# Patient Record
Sex: FEMALE | Race: WHITE | NOT HISPANIC OR LATINO
[De-identification: names, ages, dates, MRNs, and addresses within clinical notes are randomized per-mention and may not be internally consistent; named-entity substitution may affect disease eponyms.]

---

## 2017-03-23 ENCOUNTER — RESULT REVIEW (OUTPATIENT)
Age: 62
End: 2017-03-23

## 2017-05-23 ENCOUNTER — APPOINTMENT (OUTPATIENT)
Dept: INTERNAL MEDICINE | Facility: CLINIC | Age: 62
End: 2017-05-23

## 2017-05-23 VITALS
SYSTOLIC BLOOD PRESSURE: 115 MMHG | HEIGHT: 62 IN | RESPIRATION RATE: 14 BRPM | OXYGEN SATURATION: 97 % | WEIGHT: 136 LBS | BODY MASS INDEX: 25.03 KG/M2 | TEMPERATURE: 97.9 F | DIASTOLIC BLOOD PRESSURE: 70 MMHG | HEART RATE: 63 BPM

## 2017-05-23 DIAGNOSIS — Z87.898 PERSONAL HISTORY OF OTHER SPECIFIED CONDITIONS: ICD-10-CM

## 2017-05-23 DIAGNOSIS — N89.8 OTHER SPECIFIED NONINFLAMMATORY DISORDERS OF VAGINA: ICD-10-CM

## 2017-05-23 DIAGNOSIS — Z82.0 FAMILY HISTORY OF EPILEPSY AND OTHER DISEASES OF THE NERVOUS SYSTEM: ICD-10-CM

## 2017-05-23 DIAGNOSIS — R19.8 OTHER SPECIFIED SYMPTOMS AND SIGNS INVOLVING THE DIGESTIVE SYSTEM AND ABDOMEN: ICD-10-CM

## 2017-05-24 LAB
25(OH)D3 SERPL-MCNC: 44.6 NG/ML
ALBUMIN SERPL ELPH-MCNC: 4.8 G/DL
ALP BLD-CCNC: 57 U/L
ALT SERPL-CCNC: 33 U/L
ANION GAP SERPL CALC-SCNC: 23 MMOL/L
APPEARANCE: CLEAR
AST SERPL-CCNC: 23 U/L
BASOPHILS # BLD AUTO: 0.03 K/UL
BASOPHILS NFR BLD AUTO: 0.6 %
BILIRUB SERPL-MCNC: 0.8 MG/DL
BILIRUBIN URINE: NEGATIVE
BLOOD URINE: NEGATIVE
BUN SERPL-MCNC: 14 MG/DL
CALCIUM SERPL-MCNC: 10.2 MG/DL
CHLORIDE SERPL-SCNC: 103 MMOL/L
CHOLEST SERPL-MCNC: 287 MG/DL
CHOLEST/HDLC SERPL: 3.3 RATIO
CO2 SERPL-SCNC: 19 MMOL/L
COLOR: YELLOW
CREAT SERPL-MCNC: 0.94 MG/DL
EOSINOPHIL # BLD AUTO: 0.16 K/UL
EOSINOPHIL NFR BLD AUTO: 3.3 %
GLUCOSE QUALITATIVE U: NORMAL MG/DL
GLUCOSE SERPL-MCNC: 91 MG/DL
HBA1C MFR BLD HPLC: 5.2 %
HCT VFR BLD CALC: 43.4 %
HDLC SERPL-MCNC: 88 MG/DL
HGB BLD-MCNC: 14.3 G/DL
IMM GRANULOCYTES NFR BLD AUTO: 0 %
KETONES URINE: NEGATIVE
LDLC SERPL CALC-MCNC: 159 MG/DL
LEUKOCYTE ESTERASE URINE: NEGATIVE
LYMPHOCYTES # BLD AUTO: 1.8 K/UL
LYMPHOCYTES NFR BLD AUTO: 37.3 %
MAN DIFF?: NORMAL
MCHC RBC-ENTMCNC: 30.6 PG
MCHC RBC-ENTMCNC: 32.9 GM/DL
MCV RBC AUTO: 92.9 FL
MONOCYTES # BLD AUTO: 0.33 K/UL
MONOCYTES NFR BLD AUTO: 6.8 %
NEUTROPHILS # BLD AUTO: 2.5 K/UL
NEUTROPHILS NFR BLD AUTO: 52 %
NITRITE URINE: NEGATIVE
PH URINE: 8
PLATELET # BLD AUTO: 217 K/UL
POTASSIUM SERPL-SCNC: 4.3 MMOL/L
PROT SERPL-MCNC: 8 G/DL
PROTEIN URINE: NEGATIVE MG/DL
RBC # BLD: 4.67 M/UL
RBC # FLD: 12.9 %
SODIUM SERPL-SCNC: 145 MMOL/L
SPECIFIC GRAVITY URINE: 1.02
TRIGL SERPL-MCNC: 200 MG/DL
TSH SERPL-ACNC: 1.77 UIU/ML
UROBILINOGEN URINE: NORMAL MG/DL
WBC # FLD AUTO: 4.82 K/UL

## 2018-06-18 ENCOUNTER — APPOINTMENT (OUTPATIENT)
Dept: INTERNAL MEDICINE | Facility: CLINIC | Age: 63
End: 2018-06-18
Payer: COMMERCIAL

## 2018-06-18 ENCOUNTER — LABORATORY RESULT (OUTPATIENT)
Age: 63
End: 2018-06-18

## 2018-06-18 VITALS
DIASTOLIC BLOOD PRESSURE: 70 MMHG | WEIGHT: 138 LBS | SYSTOLIC BLOOD PRESSURE: 120 MMHG | TEMPERATURE: 98.5 F | OXYGEN SATURATION: 98 % | HEIGHT: 62 IN | BODY MASS INDEX: 25.4 KG/M2 | RESPIRATION RATE: 14 BRPM | HEART RATE: 73 BPM

## 2018-06-18 DIAGNOSIS — Z78.9 OTHER SPECIFIED HEALTH STATUS: ICD-10-CM

## 2018-06-18 PROCEDURE — 99396 PREV VISIT EST AGE 40-64: CPT | Mod: 25

## 2018-06-18 PROCEDURE — 36415 COLL VENOUS BLD VENIPUNCTURE: CPT

## 2018-06-18 RX ORDER — ESTRADIOL 0.1 MG/G
0.1 CREAM VAGINAL
Refills: 0 | Status: DISCONTINUED | COMMUNITY
Start: 2017-05-23 | End: 2018-06-18

## 2018-06-18 NOTE — ASSESSMENT
[FreeTextEntry1] : \par hx skin irritation- at right ear lobe, no evidence of infection currently\par -advised to monitor, avoid touching, use OTC topical abx prn\par -advised to f/u if sx's worsen, pain, d/c et.\par -advised derm eval if lesion does not completely resolve\par -advised to check pillow re: ? cause for irritation and avoid use\par \par hx diarrhea/constipations- chronic, stable, hx 11/16 c-scope per pt; asx\par -followed by GI, seen 10/16 in 11/16 had EGD and c-scope, told showed ? mild UC but not tx needed- f/u pending\par \par hx osteoporosis, vit d insuff--\par -hx DEXA in 1/17 by GYN per pt, repeat due 2019\par -on actonel qwk (since 2/14)\par -on ca+D\par -exercise encouraged\par -check vit d level\par \par migraines--asx, infrequent\par -hx negative w/u and MRI imaging by neuro --given imitrex 50mg with reported good control.  \par -on imitrex prn\par \par HLD--5/17 Tchol 287   HD 88\par -hx declined Rx\par -on diet control, low fat diet counseled\par -declines nutrition eval\par -check lipids today\par \par hx stress- stable per pt, denies depression or anxiety\par -declines BH referral\par -advised to f/u if sx's worsen\par \par \par HCM\par --fasting screening labs; declines HIV/STD screening\par --5/14 screening hep C AB negative\par --declines flu shots\par --hx Tdap 2011\par --advised shingrix vaccine (out of stock today)- to f/u with pharmacy, to f/u in office if unable to obtain there\par --hx neg PAP 3/17 by GYN per pt\par --hx negative screening mammo 3/18 by GYN per pt\par --hx screening colonoscopy 11/16: told hemorrhoids, and repeat in 5 yrs per pt\par --followed by derm, hx screening 8/17- yearly advised.  Regular use of sun block for skin cancer prevention counseled. \par --yearly eye exam advised, has own doctor\par \par Pt's cell: [phone]\par Pt declines f/u appt, to call back.

## 2018-06-18 NOTE — HEALTH RISK ASSESSMENT
[MammogramDate] : 3/18 [PapSmearDate] : 11/17 [BoneDensityDate] : 1/17 [BoneDensityComments] : osteoporosis [ColonoscopyDate] : 11/16 [HepatitisCDate] : 5/14 [HepatitisCComments] : negative

## 2018-06-18 NOTE — REVIEW OF SYSTEMS
[see HPI] : see HPI [Negative] : Musculoskeletal [Fever] : no fever [Chills] : no chills [Feeling Poorly] : not feeling poorly [Feeling Tired] : not feeling tired [Chest Pain] : no chest pain [Palpitations] : no palpitations [Lower Ext Edema] : no lower extremity edema [Shortness Of Breath] : no shortness of breath [Cough] : no cough [Orthopnea] : no orthopnea [PND] : no PND [Abdominal Pain] : no abdominal pain [Vomiting] : no vomiting [Dizziness] : no dizziness [Limb Weakness] : no limb weakness [Difficulty Walking] : no difficulty walking [Suicidal] : not suicidal [Anxiety] : no anxiety [Depression] : no depression

## 2018-06-18 NOTE — PHYSICAL EXAM
[General Appearance - Alert] : alert [General Appearance - In No Acute Distress] : in no acute distress [General Appearance - Well-Appearing] : healthy appearing [Sclera] : the sclera and conjunctiva were normal [PERRL With Normal Accommodation] : pupils were equal in size, round, and reactive to light [Extraocular Movements] : extraocular movements were intact [Outer Ear] : the ears and nose were normal in appearance [Both Tympanic Membranes Were Examined] : both tympanic membranes were normal [Nasal Cavity] : the nasal mucosa and septum were normal [Oropharynx] : the oropharynx was normal [Neck Appearance] : the appearance of the neck was normal [Thyroid Diffuse Enlargement] : the thyroid was not enlarged [Thyroid Nodule] : there were no palpable thyroid nodules [Respiration, Rhythm And Depth] : normal respiratory rhythm and effort [Auscultation Breath Sounds / Voice Sounds] : lungs were clear to auscultation bilaterally [Heart Rate And Rhythm] : heart rate was normal and rhythm regular [Heart Sounds] : normal S1 and S2 [Murmurs] : no murmurs [Full Pulse] : the pedal pulses are present [Edema] : there was no peripheral edema [Bowel Sounds] : normal bowel sounds [Abdomen Soft] : soft [Abdomen Tenderness] : non-tender [Cervical Lymph Nodes Enlarged Posterior Bilaterally] : posterior cervical [Cervical Lymph Nodes Enlarged Anterior Bilaterally] : anterior cervical [Supraclavicular Lymph Nodes Enlarged Bilaterally] : supraclavicular [No CVA Tenderness] : no ~M costovertebral angle tenderness [No Spinal Tenderness] : no spinal tenderness [Abnormal Walk] : normal gait [Musculoskeletal - Swelling] : no joint swelling seen [] : no rash [No Focal Deficits] : no focal deficits [Oriented To Time, Place, And Person] : oriented to person, place, and time [Affect] : the affect was normal [Mood] : the mood was normal [FreeTextEntry1] : scattered freckles; right ear lob: tiny area of scab w/o focal inflammation or tenderness

## 2018-06-18 NOTE — HISTORY OF PRESENT ILLNESS
[Health Maintenance] : health maintenance [___ Year(s) Ago] : [unfilled] year(s) ago [___ Daughter(s)] : [unfilled] daughter(s) [Never Smoked Cigarettes] : has never smoked cigarettes [___ Drinks/Day] : drinking [unfilled] drinks per day [Never] : has never used illicit drugs [Good] : good [Reg. Dental Visits] : She has regular dental visits [Healthy Diet] : She consumes a diverse and healthy diet [Regular Exercise] : She exercises regularly [Alcohol Use] : She consumes alcohol [Occasional Use] : occasional alcohol use [Postmenopausal] : the patient is postmenopausal [Uncertain Timing] : uncertain timing of her last human papilloma virus screening [Never Performed] : no previous colposcopy [Performed Irregular] : irregular breast self-exams performed [Performed: ___] : a colonoscopy performed [unfilled] [FreeTextEntry1] : \par Feeling well today.\par Fasting for labs.\par Needs med refills.\par \par \par Reports had dry skin at right ear- picked it off with hand (did not come off)- next day was area hot, throbbing; no d/c or fever/chills.  Getting less since.  Used OTC spray (for cuts/scrapes, antibacterial) to area.\par -thinks acquired due to irritation of area from pillow case\par \par hx diarrhea/constipation with urge to defecate- chronic, stable\par -states resolved since had c-scope 11/16\par -followed by GI, seen 10/16 in 11/16 had EGD and c-scope, told showed ? mild UC but not tx needed- f/u pending\par -wt stable, appetite nl; no BRBPR or melena\par -denies fatigue, heat/cold intolerance or hair/nail issues\par \par hx stress- stable, mainly 2/2 mom with dementia- in NH, is wheelchair bound, sometimes forgets names, gets help caring for her sisters\par -denies depression or anxiety\par -reports good social support from family\par -hx therapist in past when  passed away- states can always return there, does not feel is needed\par \par hx migraine HA-- Rare onset.  Usually has change in vision and b/l periocular HA--seen by neurology in past yrs ago, had brain MRI told nl and started on imitrex 50mg x1 then with good control.  Denies any focal weakness.\par \par HLD-- never started zocor due to concern for SEs\par -avoids fatty foods, more veggies\par -exercise: walks 2x/wk x 30 mins\par \par hx OP, vitamin d insuff- denies hx fractures\par -following with GYN, Dr. Javed- had DEXA 1/17 told slightly better and to cont. actonel.  n\par -on actonel, Ca+D\par \par \par Not sexually active > 5 yrs, denies hx of STD dx.  \par -denies any  complaints.\par  [Binge Drinking] : denies binge drinking [Patient Concern] : no personal concern about alcohol use [Family Concern] : no family concern about alcohol use [Vision Problems] : She denies vision problems [Hearing Loss] : She denies hearing loss [Weight Concerns] : She does not have any weight concerns [Tobacco Use] : She does not use tobacco [Drug Abuse] : She denies drug use [de-identified] :  [de-identified] : retired [de-identified] : declines flu shots, hx Tdap 2011, no hx shingles vaccine

## 2018-06-18 NOTE — PAST MEDICAL HISTORY
[Menopause Age____] : age at menopause was [unfilled] [Total Preg ___] : G: [unfilled] [Living ___] : Living: [unfilled]  [AB Spont ___] : miscarriage(s): [unfilled]

## 2018-06-18 NOTE — DISCUSSION/SUMMARY
[FreeTextEntry1] : \par hx diarrhea/constipations- resolved since 11/16 c-scope per pt; asx\par -followed by GI, seen 10/16 in 11/16 had EGD and c-scope, told showed ? mild UC but not tx needed- f/u pending\par \par hx osteoporosis, vit d insuff--\par -hx DEXA in 1/17 by GYN per pt\par -on actonel qwk (since 2/14)\par -on ca+D\par -exercise encouraged\par \par hx arthritis--rt thumb, stable\par -followed by ortho\par -using Tylenol prn, rarely\par -cont home PT exercises\par \par migraines--asx, infrequent\par -hx negative w/u and MRI imaging by neuro --given imitrex 50mg with reported good control.  \par -on imitrex prn\par \par HLD--5/15 Tchol 289   HD 86\par -hx declined Rx\par -on diet control, low fat diet counseled\par -declines nutrition eval\par -check lipids today\par \par hx L-shoulder arm discomfort--resolved with PT\par -followed by Dr. Winters ([facility]), dx'd with tendonitis\par \par hx tooth grinding/TMJ-- resolved \par -s/p mouth guard by dentist\par \par \par HCM\par --fasting screening labs; declines HIV/STD screening; pt defers screening UA to next visit\par --5/14 screening hep C AB negative\par --declines flu shots\par --hx Tdap 2011\par --hx neg PAP 3/17 by GYN per pt\par --hx negative screening mammo 1/16 by GYN per pt\par --hx screening colonoscopy 2011 s/p polyp--for repeat in 5 yrs (due 2016)- has appt 9/16\par --hx derm eval for screening in 2014, yearly advised.  Regular use of sun block for skin cancer prevention counseled. \par --yearly eye exam advised, has own doctor\par \par Pt's cell: [phone]

## 2018-06-19 LAB
25(OH)D3 SERPL-MCNC: 41.2 NG/ML
ALBUMIN SERPL ELPH-MCNC: 5.2 G/DL
ALP BLD-CCNC: 61 U/L
ALT SERPL-CCNC: 27 U/L
ANION GAP SERPL CALC-SCNC: 18 MMOL/L
APPEARANCE: CLEAR
AST SERPL-CCNC: 23 U/L
BASOPHILS # BLD AUTO: 0.04 K/UL
BASOPHILS NFR BLD AUTO: 0.6 %
BILIRUB SERPL-MCNC: 1 MG/DL
BILIRUBIN URINE: NEGATIVE
BLOOD URINE: NEGATIVE
BUN SERPL-MCNC: 16 MG/DL
CALCIUM SERPL-MCNC: 10.6 MG/DL
CHLORIDE SERPL-SCNC: 101 MMOL/L
CHOLEST SERPL-MCNC: 319 MG/DL
CHOLEST/HDLC SERPL: 3.3 RATIO
CO2 SERPL-SCNC: 23 MMOL/L
COLOR: YELLOW
CREAT SERPL-MCNC: 1.04 MG/DL
EOSINOPHIL # BLD AUTO: 0.16 K/UL
EOSINOPHIL NFR BLD AUTO: 2.5 %
GLUCOSE QUALITATIVE U: NEGATIVE MG/DL
GLUCOSE SERPL-MCNC: 88 MG/DL
HBA1C MFR BLD HPLC: 5.3 %
HCT VFR BLD CALC: 44.9 %
HDLC SERPL-MCNC: 97 MG/DL
HGB BLD-MCNC: 14.7 G/DL
IMM GRANULOCYTES NFR BLD AUTO: 0.2 %
KETONES URINE: NEGATIVE
LDLC SERPL CALC-MCNC: 185 MG/DL
LEUKOCYTE ESTERASE URINE: ABNORMAL
LYMPHOCYTES # BLD AUTO: 2.32 K/UL
LYMPHOCYTES NFR BLD AUTO: 36.7 %
MAN DIFF?: NORMAL
MCHC RBC-ENTMCNC: 30.6 PG
MCHC RBC-ENTMCNC: 32.7 GM/DL
MCV RBC AUTO: 93.5 FL
MONOCYTES # BLD AUTO: 0.4 K/UL
MONOCYTES NFR BLD AUTO: 6.3 %
NEUTROPHILS # BLD AUTO: 3.39 K/UL
NEUTROPHILS NFR BLD AUTO: 53.7 %
NITRITE URINE: NEGATIVE
PH URINE: 5.5
PLATELET # BLD AUTO: 220 K/UL
POTASSIUM SERPL-SCNC: 4.1 MMOL/L
PROT SERPL-MCNC: 8 G/DL
PROTEIN URINE: NEGATIVE MG/DL
RBC # BLD: 4.8 M/UL
RBC # FLD: 13.1 %
SODIUM SERPL-SCNC: 142 MMOL/L
SPECIFIC GRAVITY URINE: 1.01
TRIGL SERPL-MCNC: 184 MG/DL
TSH SERPL-ACNC: 2.49 UIU/ML
UROBILINOGEN URINE: NEGATIVE MG/DL
WBC # FLD AUTO: 6.32 K/UL

## 2018-11-23 ENCOUNTER — NON-APPOINTMENT (OUTPATIENT)
Age: 63
End: 2018-11-23

## 2018-11-23 ENCOUNTER — APPOINTMENT (OUTPATIENT)
Dept: INTERNAL MEDICINE | Facility: CLINIC | Age: 63
End: 2018-11-23
Payer: COMMERCIAL

## 2018-11-23 VITALS
DIASTOLIC BLOOD PRESSURE: 76 MMHG | WEIGHT: 138 LBS | HEART RATE: 82 BPM | SYSTOLIC BLOOD PRESSURE: 124 MMHG | OXYGEN SATURATION: 98 % | HEIGHT: 62 IN | BODY MASS INDEX: 25.4 KG/M2 | TEMPERATURE: 98.5 F

## 2018-11-23 PROCEDURE — 93000 ELECTROCARDIOGRAM COMPLETE: CPT

## 2018-11-23 PROCEDURE — 99214 OFFICE O/P EST MOD 30 MIN: CPT | Mod: 25

## 2018-11-23 NOTE — HISTORY OF PRESENT ILLNESS
[FreeTextEntry8] : Carisa is a 64yo female who presents today with episodes of dizziness, palpitations, and shortness of breath x 10 days. 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 She was unable to check BP as she only had pediatric cuffs. The following day, the pt developed a cold and has had sinus congestion since then.  Maybe some shortness of breath carrying laundry from basement to second floor, but no dizziness of palpitations at the time.  She also continues sessions with a  and denies any symptoms of increased dyspnea, dizziness, palpitations, chest pain during exercise. The episodes of dizziness and palpitations may occur together or separately and occur almost every day. The last episode of palpations occurred this A.M and the last episode of dizziness occurred yesterday and was associated with palpitations.\par \par Pt endorses feeling stressed this month. Reports she has several events  this month including the anniversary of her 's death, her daughter's 18th birthday and frequent visits to see her mother in a NH. \par \par

## 2018-11-23 NOTE — PLAN
[FreeTextEntry1] : \par #1 Palpitations and Dyspnea\par \par EKG done in office- NSR. Blood work to be done in office today- CMP, CBC, Mg, and TSH. Patient given referral to cardiology and referral for holter monitor. Script given for echocardiogram. CHANDAN-7 questionnaire administered to patient with result of moderate anxiety. Patient denies further mental health services or medication for anxiety. Patient advised to seek emergency medical services if having dizziness, shortness of breath and palpitations concurrently. Patient verbalized understanding. Will f/u with results.\par \par

## 2018-11-23 NOTE — REVIEW OF SYSTEMS
[Palpitations] : palpitations [Shortness Of Breath] : shortness of breath [Dizziness] : dizziness [Negative] : Heme/Lymph [Chest Pain] : no chest pain [Lower Ext Edema] : no lower extremity edema [Orthopnea] : no orthopnea [Wheezing] : no wheezing [Cough] : no cough [Headache] : no headache [Fainting] : no fainting [Confusion] : no confusion [FreeTextEntry4] : congestion

## 2018-11-23 NOTE — PHYSICAL EXAM
[No Acute Distress] : no acute distress [Well Nourished] : well nourished [Well Developed] : well developed [Normal Sclera/Conjunctiva] : normal sclera/conjunctiva [PERRL] : pupils equal round and reactive to light [Normal Outer Ear/Nose] : the outer ears and nose were normal in appearance [Normal Oropharynx] : the oropharynx was normal [No JVD] : no jugular venous distention [Supple] : supple [No Lymphadenopathy] : no lymphadenopathy [No Respiratory Distress] : no respiratory distress  [Clear to Auscultation] : lungs were clear to auscultation bilaterally [Normal Rate] : normal rate  [Regular Rhythm] : with a regular rhythm [Normal S1, S2] : normal S1 and S2 [No Carotid Bruits] : no carotid bruits [No Edema] : there was no peripheral edema [Soft] : abdomen soft [Non Tender] : non-tender [Coordination Grossly Intact] : coordination grossly intact [No Focal Deficits] : no focal deficits

## 2018-11-27 LAB
ALBUMIN SERPL ELPH-MCNC: 4.8 G/DL
ALP BLD-CCNC: 72 U/L
ALT SERPL-CCNC: 23 U/L
ANION GAP SERPL CALC-SCNC: 14 MMOL/L
AST SERPL-CCNC: 19 U/L
BASOPHILS # BLD AUTO: 0.02 K/UL
BASOPHILS NFR BLD AUTO: 0.3 %
BILIRUB SERPL-MCNC: 0.4 MG/DL
BUN SERPL-MCNC: 13 MG/DL
CALCIUM SERPL-MCNC: 10 MG/DL
CHLORIDE SERPL-SCNC: 100 MMOL/L
CO2 SERPL-SCNC: 24 MMOL/L
CREAT SERPL-MCNC: 0.97 MG/DL
EOSINOPHIL # BLD AUTO: 0.19 K/UL
EOSINOPHIL NFR BLD AUTO: 3.2 %
GLUCOSE SERPL-MCNC: 95 MG/DL
HCT VFR BLD CALC: 44.3 %
HGB BLD-MCNC: 14.6 G/DL
IMM GRANULOCYTES NFR BLD AUTO: 0.3 %
LYMPHOCYTES # BLD AUTO: 1.9 K/UL
LYMPHOCYTES NFR BLD AUTO: 31.8 %
MAGNESIUM SERPL-MCNC: 2.1 MG/DL
MAN DIFF?: NORMAL
MCHC RBC-ENTMCNC: 30.8 PG
MCHC RBC-ENTMCNC: 33 GM/DL
MCV RBC AUTO: 93.5 FL
MONOCYTES # BLD AUTO: 0.4 K/UL
MONOCYTES NFR BLD AUTO: 6.7 %
NEUTROPHILS # BLD AUTO: 3.44 K/UL
NEUTROPHILS NFR BLD AUTO: 57.7 %
PLATELET # BLD AUTO: 241 K/UL
POTASSIUM SERPL-SCNC: 4 MMOL/L
PROT SERPL-MCNC: 7.6 G/DL
RBC # BLD: 4.74 M/UL
RBC # FLD: 13.3 %
SODIUM SERPL-SCNC: 138 MMOL/L
TSH SERPL-ACNC: 2.87 UIU/ML
WBC # FLD AUTO: 5.97 K/UL

## 2018-11-30 LAB
ANION GAP SERPL CALC-SCNC: 13 MMOL/L
BUN SERPL-MCNC: 16 MG/DL
CALCIUM SERPL-MCNC: 9.9 MG/DL
CHLORIDE SERPL-SCNC: 104 MMOL/L
CHOLEST SERPL-MCNC: 265 MG/DL
CHOLEST/HDLC SERPL: 3.4 RATIO
CO2 SERPL-SCNC: 24 MMOL/L
CREAT SERPL-MCNC: 0.76 MG/DL
GLUCOSE SERPL-MCNC: 87 MG/DL
HDLC SERPL-MCNC: 79 MG/DL
LDLC SERPL CALC-MCNC: 154 MG/DL
POTASSIUM SERPL-SCNC: 4.2 MMOL/L
SODIUM SERPL-SCNC: 141 MMOL/L
TRIGL SERPL-MCNC: 162 MG/DL

## 2018-12-20 ENCOUNTER — RESULT REVIEW (OUTPATIENT)
Age: 63
End: 2018-12-20

## 2019-02-01 ENCOUNTER — APPOINTMENT (OUTPATIENT)
Dept: CARDIOLOGY | Facility: CLINIC | Age: 64
End: 2019-02-01
Payer: COMMERCIAL

## 2019-02-01 ENCOUNTER — NON-APPOINTMENT (OUTPATIENT)
Age: 64
End: 2019-02-01

## 2019-02-01 VITALS
BODY MASS INDEX: 25.4 KG/M2 | SYSTOLIC BLOOD PRESSURE: 120 MMHG | DIASTOLIC BLOOD PRESSURE: 80 MMHG | WEIGHT: 138 LBS | HEIGHT: 62 IN | OXYGEN SATURATION: 100 % | TEMPERATURE: 97.7 F | HEART RATE: 73 BPM | RESPIRATION RATE: 14 BRPM

## 2019-02-01 DIAGNOSIS — Z82.49 FAMILY HISTORY OF ISCHEMIC HEART DISEASE AND OTHER DISEASES OF THE CIRCULATORY SYSTEM: ICD-10-CM

## 2019-02-01 DIAGNOSIS — Z81.8 FAMILY HISTORY OF OTHER MENTAL AND BEHAVIORAL DISORDERS: ICD-10-CM

## 2019-02-01 DIAGNOSIS — Z82.3 FAMILY HISTORY OF STROKE: ICD-10-CM

## 2019-02-01 PROCEDURE — 93000 ELECTROCARDIOGRAM COMPLETE: CPT

## 2019-02-01 PROCEDURE — 99204 OFFICE O/P NEW MOD 45 MIN: CPT

## 2019-02-02 PROBLEM — Z82.3 FAMILY HISTORY OF CEREBROVASCULAR ACCIDENT (CVA): Status: ACTIVE | Noted: 2019-02-02

## 2019-02-02 PROBLEM — Z81.8 FAMILY HISTORY OF DEMENTIA: Status: ACTIVE | Noted: 2019-02-02

## 2019-02-02 PROBLEM — Z82.49 FAMILY HISTORY OF AORTIC STENOSIS: Status: ACTIVE | Noted: 2019-02-02

## 2019-02-02 NOTE — DISCUSSION/SUMMARY
[FreeTextEntry1] : In summary, Ms. Chau is a 63 year old female with a medical history significant for hyperlipidemia, osteoporosis, colon polyps, migraine headaches, allergic rhinits, and previous Herpes zoster.  She has been experiencing significant exertional dyspnea for the past several months.  In addition, Ms. Chau has also been experiencing episodic chest heaviness of uncertain etiology.\par \par I suggested to Ms. Chau that we proceed with an exercise stress echocardiogram for further evaluation of her symptoms of dyspnea and episodic chest discomfort.  Ms. Chau is in agreement with this plan and will schedule the exercise stress echocardiogram.\par \par Again, I would like to thank you for the privilege of participating in the care of your patient.  I will be certain to be in touch with you again following Ms. Chau's exercise stress echocardiogram.

## 2019-02-02 NOTE — HISTORY OF PRESENT ILLNESS
[FreeTextEntry1] : Ms. Chau presents to the office today for an initial cardiovascular evaluation.\par \par Ms. Chau is a 63 year old female with a medical history significant for hyperlipidemia, osteoporosis, colon polyps, migraine headaches, allergic rhinits, and previous Herpes zoster.\par \par Ms. Chau reports that she has been experiencing worsening dyspnea over the last few months.  The shortness of breath occurs with minimal levels of exertion.  Ms. Chau reports that she becomes short of breath after walking as little as 200 feet.  In addition, she has had episodes of dyspnea at rest.  In addition, Ms. Chau reports that she has experienced episodes of a "heaviness" in the chest.  This feels like a weight in the middle of the chest that most often occurs with exertion.  The chest discomfort does not radiate into the arms or neck, and is not associated with nausea, dyspnea, or diaphoresis.  Ms. Chau experiences frequent palpitations but denies having any presyncope or syncope.  In addition, there has been no history of orthopnea, paroxysmal nocturnal dyspnea, or edema.  Ms. Chau has had a lot of generalized fatigue.\par \par Ms. Chau's recent (from 11/30/2018) blood work was reviewed.  In summary, a metabolic panel demonstrated a serum potassium of 4.2 mmol/L and a serum creatinine of 0.76 mg/dL.  A complete blood count demonstrated a WBC of 6.0K, hemoglobin 14.6 g/dL, hematocrit 44.3%, and a platelet count of 241K.  A lipid profile demonstrated a total cholesterol of 265 mg/dL, triglycerides 162 mg/dL, HDL 79 mg/dL, and an LDL of 154 mg/dL.

## 2019-02-02 NOTE — PHYSICAL EXAM
[General Appearance - Well Developed] : well developed [General Appearance - Well Nourished] : well nourished [Normal Conjunctiva] : the conjunctiva exhibited no abnormalities [Eyelids - No Xanthelasma] : the eyelids demonstrated no xanthelasmas [Normal Oral Mucosa] : normal oral mucosa [No Oral Cyanosis] : no oral cyanosis [Normal Jugular Venous A Waves Present] : normal jugular venous A waves present [Normal Jugular Venous V Waves Present] : normal jugular venous V waves present [Heart Sounds] : normal S1 and S2 [Murmurs] : no murmurs present [Edema] : no peripheral edema present [] : no respiratory distress [Respiration, Rhythm And Depth] : normal respiratory rhythm and effort [Auscultation Breath Sounds / Voice Sounds] : lungs were clear to auscultation bilaterally [Bowel Sounds] : normal bowel sounds [Abdomen Soft] : soft [Abdomen Tenderness] : non-tender [Abnormal Walk] : normal gait [Nail Clubbing] : no clubbing of the fingernails [Cyanosis, Localized] : no localized cyanosis [Skin Color & Pigmentation] : normal skin color and pigmentation [Oriented To Time, Place, And Person] : oriented to person, place, and time

## 2019-02-12 ENCOUNTER — OUTPATIENT (OUTPATIENT)
Dept: OUTPATIENT SERVICES | Facility: HOSPITAL | Age: 64
LOS: 1 days | End: 2019-02-12

## 2019-02-12 ENCOUNTER — APPOINTMENT (OUTPATIENT)
Dept: CV DIAGNOSITCS | Facility: HOSPITAL | Age: 64
End: 2019-02-12
Payer: COMMERCIAL

## 2019-02-12 DIAGNOSIS — R06.09 OTHER FORMS OF DYSPNEA: ICD-10-CM

## 2019-02-12 PROCEDURE — 93325 DOPPLER ECHO COLOR FLOW MAPG: CPT | Mod: 26,GC

## 2019-02-12 PROCEDURE — 93320 DOPPLER ECHO COMPLETE: CPT | Mod: 26,GC

## 2019-02-12 PROCEDURE — 93350 STRESS TTE ONLY: CPT | Mod: 26

## 2019-02-26 ENCOUNTER — APPOINTMENT (OUTPATIENT)
Dept: CARDIOLOGY | Facility: CLINIC | Age: 64
End: 2019-02-26

## 2019-06-19 ENCOUNTER — APPOINTMENT (OUTPATIENT)
Dept: FAMILY MEDICINE | Facility: CLINIC | Age: 64
End: 2019-06-19
Payer: COMMERCIAL

## 2019-06-19 VITALS
BODY MASS INDEX: 26.5 KG/M2 | DIASTOLIC BLOOD PRESSURE: 74 MMHG | HEIGHT: 62 IN | SYSTOLIC BLOOD PRESSURE: 124 MMHG | WEIGHT: 144.03 LBS | HEART RATE: 70 BPM | OXYGEN SATURATION: 99 %

## 2019-06-19 PROCEDURE — 99396 PREV VISIT EST AGE 40-64: CPT | Mod: 25

## 2019-06-19 PROCEDURE — 36415 COLL VENOUS BLD VENIPUNCTURE: CPT

## 2019-06-19 RX ORDER — LORATADINE 10 MG
17 TABLET,DISINTEGRATING ORAL
Refills: 0 | Status: COMPLETED | COMMUNITY
End: 2019-06-19

## 2019-06-19 NOTE — HEALTH RISK ASSESSMENT
[] : No [Any fall with injury in past year] : Patient reported fall with injury in the past year [2] : 2) Feeling down, depressed, or hopeless for more than half of the days (2) [de-identified] : walks daily, weight bearing exercises twice weekly [de-identified] : healthy [[id number]] : 10 [Patient reported PAP Smear was normal] : Patient reported PAP Smear was normal [Patient reported colonoscopy was normal] : Patient reported colonoscopy was normal [HIV test declined] : HIV test declined [Hepatitis C test declined] : Hepatitis C test declined [] :  [Fully functional (bathing, dressing, toileting, transferring, walking, feeding)] : Fully functional (bathing, dressing, toileting, transferring, walking, feeding) [Fully functional (using the telephone, shopping, preparing meals, housekeeping, doing laundry, using] : Fully functional and needs no help or supervision to perform IADLs (using the telephone, shopping, preparing meals, housekeeping, doing laundry, using transportation, managing medications and managing finances) [ColonoscopyDate] : 11/16 [PapSmearDate] : 12/18

## 2019-06-19 NOTE — PHYSICAL EXAM
[No Acute Distress] : no acute distress [Supple] : supple [Well Nourished] : well nourished [No Lymphadenopathy] : no lymphadenopathy [Thyroid Normal, No Nodules] : the thyroid was normal and there were no nodules present [No Respiratory Distress] : no respiratory distress  [Clear to Auscultation] : lungs were clear to auscultation bilaterally [Normal Rate] : normal rate  [Regular Rhythm] : with a regular rhythm [Normal S1, S2] : normal S1 and S2 [No Murmur] : no murmur heard [Pedal Pulses Present] : the pedal pulses are present [No Edema] : there was no peripheral edema [Normal Posterior Cervical Nodes] : no posterior cervical lymphadenopathy [Normal Anterior Cervical Nodes] : no anterior cervical lymphadenopathy [Normal Gait] : normal gait [Normal Affect] : the affect was normal [Normal Insight/Judgement] : insight and judgment were intact

## 2019-06-19 NOTE — HISTORY OF PRESENT ILLNESS
[de-identified] : 62 yo F with osteoporosis, HLD presents for annual physical. She has been undergoing quite a bit of stress at home between caring for her mother with dementia living in a nursing home, dealing with a recent death in the family. She has friends that will be more available by the end of the month as they are teachers. She has therapist for when she dealt with the death of her  8 years ago as well that she can go to. \par \par She also sustained an injury in Feb that resulted in torn ligament. She is now able to walk on it up to a mile but there is still some residual swelling. She continues to put ice packs and keep leg elevated. Diet is very healthy. She does weight bearing exercises with a  for the osteoporosis. She recently completed a 5 year trial of actonel. She continues to take Vit D/Estevan.  [FreeTextEntry1] : annual physical

## 2019-06-21 ENCOUNTER — TRANSCRIPTION ENCOUNTER (OUTPATIENT)
Age: 64
End: 2019-06-21

## 2019-06-24 ENCOUNTER — RX RENEWAL (OUTPATIENT)
Age: 64
End: 2019-06-24

## 2019-06-24 LAB
25(OH)D3 SERPL-MCNC: 38.6 NG/ML
ALBUMIN SERPL ELPH-MCNC: 5.2 G/DL
ALP BLD-CCNC: 76 U/L
ALT SERPL-CCNC: 22 U/L
ANION GAP SERPL CALC-SCNC: 15 MMOL/L
AST SERPL-CCNC: 19 U/L
BASOPHILS # BLD AUTO: 0.03 K/UL
BASOPHILS NFR BLD AUTO: 0.5 %
BILIRUB SERPL-MCNC: 0.9 MG/DL
BUN SERPL-MCNC: 14 MG/DL
CALCIUM SERPL-MCNC: 11.3 MG/DL
CHLORIDE SERPL-SCNC: 101 MMOL/L
CHOLEST SERPL-MCNC: 324 MG/DL
CHOLEST/HDLC SERPL: 3.5 RATIO
CO2 SERPL-SCNC: 25 MMOL/L
CREAT SERPL-MCNC: 0.99 MG/DL
EOSINOPHIL # BLD AUTO: 0.26 K/UL
EOSINOPHIL NFR BLD AUTO: 4.6 %
ESTIMATED AVERAGE GLUCOSE: 103 MG/DL
GLUCOSE SERPL-MCNC: 92 MG/DL
HBA1C MFR BLD HPLC: 5.2 %
HCT VFR BLD CALC: 45 %
HDLC SERPL-MCNC: 92 MG/DL
HGB BLD-MCNC: 15 G/DL
IMM GRANULOCYTES NFR BLD AUTO: 0.2 %
LDLC SERPL CALC-MCNC: 192 MG/DL
LYMPHOCYTES # BLD AUTO: 1.7 K/UL
LYMPHOCYTES NFR BLD AUTO: 30 %
MAN DIFF?: NORMAL
MCHC RBC-ENTMCNC: 31.2 PG
MCHC RBC-ENTMCNC: 33.3 GM/DL
MCV RBC AUTO: 93.6 FL
MONOCYTES # BLD AUTO: 0.26 K/UL
MONOCYTES NFR BLD AUTO: 4.6 %
NEUTROPHILS # BLD AUTO: 3.4 K/UL
NEUTROPHILS NFR BLD AUTO: 60.1 %
PLATELET # BLD AUTO: 235 K/UL
POTASSIUM SERPL-SCNC: 4.5 MMOL/L
PROT SERPL-MCNC: 8.3 G/DL
RBC # BLD: 4.81 M/UL
RBC # FLD: 12.9 %
SODIUM SERPL-SCNC: 141 MMOL/L
TRIGL SERPL-MCNC: 199 MG/DL
TSH SERPL-ACNC: 2.6 UIU/ML
WBC # FLD AUTO: 5.66 K/UL

## 2019-08-19 ENCOUNTER — RX CHANGE (OUTPATIENT)
Age: 64
End: 2019-08-19

## 2019-08-19 RX ORDER — ATORVASTATIN CALCIUM 10 MG/1
10 TABLET, FILM COATED ORAL
Qty: 90 | Refills: 1 | Status: COMPLETED | COMMUNITY
Start: 2019-06-24 | End: 2019-08-19

## 2019-09-11 ENCOUNTER — APPOINTMENT (OUTPATIENT)
Dept: FAMILY MEDICINE | Facility: CLINIC | Age: 64
End: 2019-09-11

## 2019-09-13 LAB
ALBUMIN SERPL ELPH-MCNC: 4.8 G/DL
ALP BLD-CCNC: 70 U/L
ALT SERPL-CCNC: 40 U/L
ANION GAP SERPL CALC-SCNC: 15 MMOL/L
AST SERPL-CCNC: 27 U/L
BILIRUB SERPL-MCNC: 0.7 MG/DL
BUN SERPL-MCNC: 13 MG/DL
CA-I SERPL-SCNC: 1.26 MMOL/L
CALCIUM SERPL-MCNC: 10 MG/DL
CHLORIDE SERPL-SCNC: 104 MMOL/L
CHOLEST SERPL-MCNC: 211 MG/DL
CHOLEST/HDLC SERPL: 2.3 RATIO
CO2 SERPL-SCNC: 23 MMOL/L
CREAT SERPL-MCNC: 0.96 MG/DL
GLUCOSE SERPL-MCNC: 91 MG/DL
HDLC SERPL-MCNC: 91 MG/DL
LDLC SERPL CALC-MCNC: 90 MG/DL
POTASSIUM SERPL-SCNC: 4 MMOL/L
PROT SERPL-MCNC: 7.2 G/DL
SODIUM SERPL-SCNC: 142 MMOL/L
TRIGL SERPL-MCNC: 148 MG/DL

## 2019-09-25 ENCOUNTER — APPOINTMENT (OUTPATIENT)
Dept: INTERNAL MEDICINE | Facility: CLINIC | Age: 64
End: 2019-09-25
Payer: COMMERCIAL

## 2019-09-25 VITALS
OXYGEN SATURATION: 98 % | HEART RATE: 62 BPM | TEMPERATURE: 98.2 F | WEIGHT: 146 LBS | HEIGHT: 62 IN | DIASTOLIC BLOOD PRESSURE: 80 MMHG | SYSTOLIC BLOOD PRESSURE: 130 MMHG | RESPIRATION RATE: 14 BRPM | BODY MASS INDEX: 26.87 KG/M2

## 2019-09-25 DIAGNOSIS — Z86.39 PERSONAL HISTORY OF OTHER ENDOCRINE, NUTRITIONAL AND METABOLIC DISEASE: ICD-10-CM

## 2019-09-25 DIAGNOSIS — Z12.39 ENCOUNTER FOR OTHER SCREENING FOR MALIGNANT NEOPLASM OF BREAST: ICD-10-CM

## 2019-09-25 DIAGNOSIS — Z87.898 PERSONAL HISTORY OF OTHER SPECIFIED CONDITIONS: ICD-10-CM

## 2019-09-25 PROCEDURE — G0444 DEPRESSION SCREEN ANNUAL: CPT

## 2019-09-25 PROCEDURE — 99214 OFFICE O/P EST MOD 30 MIN: CPT | Mod: 25

## 2019-09-26 RX ORDER — LOPERAMIDE HCL 2 MG
CAPSULE ORAL
Refills: 0 | Status: ACTIVE | COMMUNITY

## 2019-09-26 NOTE — HISTORY OF PRESENT ILLNESS
[FreeTextEntry1] : F/U [de-identified] : \par 65 yo F pmhx HLD, osteoporosis, migraine HAs, stress here for f/u\par \par Last seen by me in office  for CPE.\par Last had CPE 19 by another provider with labs done.\par \par Feeling well.\par Needs med refills.\par \par \par HLD-\par -hx lipitor by another provided- states d/c'd after 2 mo 2/2 nausea and dizziness with use-->  d/c'd with sx resolution after 3d. \par -started on simvastatin , taking w/o SEs, last labs 1 mo after zocor started showed HLD improved\par -has low fat diet\par -exercisinx/wk with wt bearing exercises with .  \par \par hx fall  when lost foot getting up from recliner- had torn left foot ligaments, overall better, but occ gets pain\par -rare Tylenol use with help.\par -followed by podiatrist- tx'd with steroids and rest at onset.  Has f/u this week.\par \par migraines- controlled, rare onset\par -on imitrex prn\par \par osteoporosis- \par -last DEXA , told stable\par -followed by GYN, seen  advised f/u q 2 yrs \par -hx actonel x 5 yrs, off \par -on ca/D\par -exercising\par \par hx CP/palpitations- resolved x few months\par -followed by cardio, seen  with stress echo done - told negative\par -denies dizziness, CP, cough or palpitations\par \par hx stress- mom with dementia in NH, does not know her name any longer.  Cousin's   in \par -denies depression, panic attacks\par -sleeping okay\par -appetite okay\par -not interested in seeing therapist\par -walks, reads and connects with friends to de-stress\par \par hx diarrhea/constipation- on/off, chronic- stable sx's\par -followed by GI\par -on Imodium prn\par -hx c-scope/EGD , told no intervention needed by GI\par -denies BRPB or melena; denies n/v or abd pain.  Wt stable.\par \par Denies  complaints.\par

## 2019-09-26 NOTE — PHYSICAL EXAM
[No Acute Distress] : no acute distress [Well-Appearing] : well-appearing [Normal Sclera/Conjunctiva] : normal sclera/conjunctiva [PERRL] : pupils equal round and reactive to light [EOMI] : extraocular movements intact [Normal Outer Ear/Nose] : the outer ears and nose were normal in appearance [Normal Oropharynx] : the oropharynx was normal [Normal Nasal Mucosa] : the nasal mucosa was normal [No Lymphadenopathy] : no lymphadenopathy [Supple] : supple [No Respiratory Distress] : no respiratory distress  [Thyroid Normal, No Nodules] : the thyroid was normal and there were no nodules present [Clear to Auscultation] : lungs were clear to auscultation bilaterally [Normal Rate] : normal rate  [Regular Rhythm] : with a regular rhythm [Normal S1, S2] : normal S1 and S2 [No Murmur] : no murmur heard [Pedal Pulses Present] : the pedal pulses are present [No Edema] : there was no peripheral edema [Soft] : abdomen soft [Non Tender] : non-tender [No HSM] : no HSM [Non-distended] : non-distended [Normal Supraclavicular Nodes] : no supraclavicular lymphadenopathy [Normal Posterior Cervical Nodes] : no posterior cervical lymphadenopathy [Normal Anterior Cervical Nodes] : no anterior cervical lymphadenopathy [No CVA Tenderness] : no CVA  tenderness [No Spinal Tenderness] : no spinal tenderness [No Joint Swelling] : no joint swelling [No Rash] : no rash [No Focal Deficits] : no focal deficits [Normal Gait] : normal gait [Alert and Oriented x3] : oriented to person, place, and time [Normal Affect] : the affect was normal [de-identified] : left foot: no tenderness on palpation,  no rash

## 2019-09-26 NOTE — REVIEW OF SYSTEMS
[Negative] : Integumentary [FreeTextEntry7] : see HPI [FreeTextEntry9] : see HPI [de-identified] : see HPI [de-identified] : see HPI

## 2019-09-26 NOTE — ASSESSMENT
[FreeTextEntry1] : \par 65 yo F pmhx HLD, osteoporosis, migraine HAs, stress here for f/u\par \par HLD-  Tchol 211 (was 324)  (was 199) LDL 90 (was 192) HDL 91;  LFT wnl\par -hx lipitor intolerance 2/2 nausea and dizziness \par -on simvastatin since , taking w/o SEs\par -advised low fat diet, exercise as able (as cleared by podiatry)\par -wishes to f/u in 6mo for repeat testing\par \par migraines- controlled, rare onset\par -on imitrex prn\par \par osteoporosis- \par -hx DEXA \par -followed by GYN, seen  advised f/u q 2 yrs \par -hx actonel x 5 yrs, off \par -ca/D\par -exercising\par \par hx CP/palpitations- resolved x few months\par -followed by cardio, seen  with stress echo done told negative\par \par hx stress- mom with dementia in NH, does not know her name any longer.  Cousin's   in \par -declines  referral\par -info for behavioral health crisis walk in center given\par -reports good social support\par -advised to f/u as needed\par \par hx diarrhea/constipation- on/off, chronic- stable sx's\par -followed by GI\par -on Imodium prn\par -hx c-scope/EGD \par - cbc/TSH wnl\par - cmp wnl\par \par \par \par HCM\par -hx CPE \par -hx negative hep C screening \par -declines flu shot\par -hx negative mammo \par -hx negative PAP  by GYN\par -followed by derm, yearly screening and regular use of sun block advised\par \par Pt's cell: [phone]\par

## 2020-01-16 ENCOUNTER — APPOINTMENT (OUTPATIENT)
Dept: INTERNAL MEDICINE | Facility: CLINIC | Age: 65
End: 2020-01-16

## 2020-03-24 ENCOUNTER — APPOINTMENT (OUTPATIENT)
Dept: INTERNAL MEDICINE | Facility: CLINIC | Age: 65
End: 2020-03-24

## 2020-05-08 ENCOUNTER — RX RENEWAL (OUTPATIENT)
Age: 65
End: 2020-05-08

## 2020-06-06 LAB
25(OH)D3 SERPL-MCNC: 40.5 NG/ML
ALBUMIN SERPL ELPH-MCNC: 4.9 G/DL
ALP BLD-CCNC: 73 U/L
ALT SERPL-CCNC: 46 U/L
ANION GAP SERPL CALC-SCNC: 14 MMOL/L
APPEARANCE: CLEAR
AST SERPL-CCNC: 28 U/L
BACTERIA: NEGATIVE
BASOPHILS # BLD AUTO: 0.04 K/UL
BASOPHILS NFR BLD AUTO: 0.7 %
BILIRUB SERPL-MCNC: 0.6 MG/DL
BILIRUBIN URINE: NEGATIVE
BLOOD URINE: NEGATIVE
BUN SERPL-MCNC: 16 MG/DL
CALCIUM SERPL-MCNC: 10 MG/DL
CHLORIDE SERPL-SCNC: 101 MMOL/L
CHOLEST SERPL-MCNC: 264 MG/DL
CHOLEST/HDLC SERPL: 3.1 RATIO
CO2 SERPL-SCNC: 25 MMOL/L
COLOR: NORMAL
CREAT SERPL-MCNC: 1 MG/DL
EOSINOPHIL # BLD AUTO: 0.27 K/UL
EOSINOPHIL NFR BLD AUTO: 5 %
ESTIMATED AVERAGE GLUCOSE: 105 MG/DL
GLUCOSE QUALITATIVE U: NEGATIVE
GLUCOSE SERPL-MCNC: 96 MG/DL
HBA1C MFR BLD HPLC: 5.3 %
HCT VFR BLD CALC: 42.5 %
HDLC SERPL-MCNC: 86 MG/DL
HGB BLD-MCNC: 13.6 G/DL
HYALINE CASTS: 5 /LPF
IMM GRANULOCYTES NFR BLD AUTO: 0.2 %
KETONES URINE: NEGATIVE
LDLC SERPL CALC-MCNC: 145 MG/DL
LEUKOCYTE ESTERASE URINE: ABNORMAL
LYMPHOCYTES # BLD AUTO: 1.79 K/UL
LYMPHOCYTES NFR BLD AUTO: 33 %
MAN DIFF?: NORMAL
MCHC RBC-ENTMCNC: 30.4 PG
MCHC RBC-ENTMCNC: 32 GM/DL
MCV RBC AUTO: 94.9 FL
MICROSCOPIC-UA: NORMAL
MONOCYTES # BLD AUTO: 0.29 K/UL
MONOCYTES NFR BLD AUTO: 5.3 %
NEUTROPHILS # BLD AUTO: 3.03 K/UL
NEUTROPHILS NFR BLD AUTO: 55.8 %
NITRITE URINE: NEGATIVE
PH URINE: 6
PLATELET # BLD AUTO: 238 K/UL
POTASSIUM SERPL-SCNC: 4.3 MMOL/L
PROT SERPL-MCNC: 7.4 G/DL
PROTEIN URINE: NEGATIVE
RBC # BLD: 4.48 M/UL
RBC # FLD: 12.4 %
RED BLOOD CELLS URINE: 3 /HPF
SODIUM SERPL-SCNC: 140 MMOL/L
SPECIFIC GRAVITY URINE: 1.01
SQUAMOUS EPITHELIAL CELLS: 10 /HPF
TRIGL SERPL-MCNC: 166 MG/DL
TSH SERPL-ACNC: 2.68 UIU/ML
UROBILINOGEN URINE: NORMAL
WBC # FLD AUTO: 5.43 K/UL
WHITE BLOOD CELLS URINE: 6 /HPF

## 2020-06-09 ENCOUNTER — APPOINTMENT (OUTPATIENT)
Dept: INTERNAL MEDICINE | Facility: CLINIC | Age: 65
End: 2020-06-09
Payer: COMMERCIAL

## 2020-06-09 PROCEDURE — 99213 OFFICE O/P EST LOW 20 MIN: CPT | Mod: 95

## 2020-06-09 RX ORDER — SIMVASTATIN 20 MG/1
20 TABLET, FILM COATED ORAL
Qty: 90 | Refills: 1 | Status: DISCONTINUED | COMMUNITY
Start: 2019-08-19 | End: 2020-06-09

## 2020-06-09 NOTE — HISTORY OF PRESENT ILLNESS
[Home] : at home, [unfilled] , at the time of the visit. [Medical Office: ([facility])___] : at the medical office located in  [FreeTextEntry1] : f/u and lab review [de-identified] : \par As this is a telemedicine visit, no physical exam could be performed.  Diagnosis and treatment is based on symptoms provided.\par \par cc: HLD f/u\par \par Had labs done 6/6/20.\par \par Last seen in office 9/19 for f/u.\par \par c/o wt gain ~ 10 lbs and water retention ("bags under eyes", tight fitting shoes and rings, stomach fat gain) feels is 2/2 statin.  Was taking zocor 20mg and recently made adjustments- changed to qod x 6 weeks, then 2 weeks ago changed to q 3 d with noted wt loss since doing so.  Feels water retention now resolved.\par -feels diet has been unchanged since prior to starting zocor; has low salt diet\par -not meeting with  since covid pandemic, but trying to increase walking- going 4-5x/wk x 30 mins doing this since 3/20- done w/o sx's or limitations\par -home wt was 147 (in 4/20), thinks was 138 prior (per chart records 146 at 9/19 visit) at--> 143 this morning at home\par \par Feeling well otherwise.  Social distancing, no sick/covid contacts.  Using covid precautions.\par \par Denies fever/chills, cough, CP, sob, palpitations, dizziness, PND, orthopnea or leg swelling\par \par hx TMJ- right jaw muscle spasms, getting better\par -following with dental specialist in TMJ- has f/u 6/20\par -doing PT since 10/19- since 1/20 went 2x/wk, then stopped due to covid pandemic and restarted 2 weeks ago at 1x/wk- feels is helping\par -using dental guard\par -using ice/heat\par -hx muscle relaxer use, no longer using.  No pain meds taken\par \par hx diarrhea/constipation- on/off, chronic- stable sx's\par -followed by GI\par -on Imodium prn\par -hx c-scope/EGD 11/16, told no intervention needed by GI\par -reports nl appetite; denies BRBPR, melena, n/v or abd pain. \par \par Denies  complaints.\par \par Denies depression or anxiety.

## 2020-06-09 NOTE — ASSESSMENT
[FreeTextEntry1] : 63 yo F pmhx HLD, osteoporosis, migraine HAs, stress here for f/u\par \par HLD- 6/20 Tchol 264 (was 211 in 9/19)  (was 148)  (was 90) HDL 86; LFTs wnl\par -hx lipitor intolerance 2/2 nausea and dizziness \par -hx wt gain and water retention attributes to simvastatin (taking since 8/19), recently decreasing frequency of use with improved wt and retention reported.  Asx otherwise.  Advised to d/c simvastatin.  Will plan to f/u in office for evaluation in coming 1-2 weeks.  Consider alternate statin pending sx resolution.\par -advised low fat diet, exercise encouraged\par -advised prompt medical eval if sx's worsen or CP, sob, dizziness, etc.\par \par hx TMJ- right jaw muscle spasms, getting better\par -following with dental specialist in TMJ- has f/u 6/20\par -doing PT \par -using dental guard\par -using ice/heat\par -hx muscle relaxer use, no longer using.  No pain meds taken\par \par migraines- controlled, rare onset\par -on imitrex prn\par \par osteoporosis- \par -hx DEXA 7/19\par -followed by GYN, seen 12/18 advised f/u q 2 yrs \par -hx actonel x 5 yrs, off 8/18\par -ca/D\par -exercising\par \par hx CP/palpitations- resolved \par -followed by cardio, seen 2/19 with stress echo done told negative\par \par hx stress- mom with dementia in NH, stable per pt.  Denies depression or anxiety.\par -declines  referral\par -info for behavioral health crisis walk in center given prior\par -reports good social support\par -advised to f/u as needed\par \par hx diarrhea/constipation- on/off, chronic- stable sx's\par -followed by GI\par -on Imodium prn\par -hx c-scope/EGD 11/16\par -6/20 cbc/TSH wnl\par -6/20 cmp wnl, except ALT 46 (nl 45)- consider repeat at next visit to determine need for US liver\par -advised to f/u if GI sx's arise\par \par \par HCM\par -hx CPE 6/19, yearly advised\par -6/20 cbc/bmp/TSH/A1c/vit d wnl; screening UA no prt\par -hx negative hep C screening 5/14\par -hx negative mammo 7/19, will give Rx for repeat at next visit\par \par Pt's cell: [phone]\par \par Pt to f/u in 2 weeks, sooner as needed.\par \par Pt has prior scheduled appt for CPE 9/8/20.

## 2020-06-09 NOTE — PHYSICAL EXAM
[No Acute Distress] : no acute distress [Well-Appearing] : well-appearing [Normal Affect] : the affect was normal [Alert and Oriented x3] : oriented to person, place, and time

## 2020-06-23 ENCOUNTER — APPOINTMENT (OUTPATIENT)
Dept: INTERNAL MEDICINE | Facility: CLINIC | Age: 65
End: 2020-06-23
Payer: COMMERCIAL

## 2020-06-23 VITALS
HEIGHT: 62 IN | RESPIRATION RATE: 14 BRPM | WEIGHT: 145 LBS | HEART RATE: 80 BPM | DIASTOLIC BLOOD PRESSURE: 70 MMHG | SYSTOLIC BLOOD PRESSURE: 122 MMHG | OXYGEN SATURATION: 98 % | BODY MASS INDEX: 26.68 KG/M2 | TEMPERATURE: 98 F

## 2020-06-23 DIAGNOSIS — Z87.39 PERSONAL HISTORY OF OTHER DISEASES OF THE MUSCULOSKELETAL SYSTEM AND CONNECTIVE TISSUE: ICD-10-CM

## 2020-06-23 PROCEDURE — 99214 OFFICE O/P EST MOD 30 MIN: CPT | Mod: 25

## 2020-06-23 PROCEDURE — 36415 COLL VENOUS BLD VENIPUNCTURE: CPT

## 2020-06-23 NOTE — REVIEW OF SYSTEMS
[Negative] : Neurological [FreeTextEntry9] : hand swelling on/off [de-identified] : h [de-identified] : see HPI

## 2020-06-23 NOTE — PHYSICAL EXAM
[Well-Appearing] : well-appearing [No Acute Distress] : no acute distress [Normal Outer Ear/Nose] : the outer ears and nose were normal in appearance [Normal Sclera/Conjunctiva] : normal sclera/conjunctiva [Clear to Auscultation] : lungs were clear to auscultation bilaterally [Normal Rate] : normal rate  [Regular Rhythm] : with a regular rhythm [Normal S1, S2] : normal S1 and S2 [No Murmur] : no murmur heard [Pedal Pulses Present] : the pedal pulses are present [No Edema] : there was no peripheral edema [Soft] : abdomen soft [Non Tender] : non-tender [No CVA Tenderness] : no CVA  tenderness [No HSM] : no HSM [No Joint Swelling] : no joint swelling [No Spinal Tenderness] : no spinal tenderness [No Rash] : no rash [Grossly Normal Strength/Tone] : grossly normal strength/tone [No Focal Deficits] : no focal deficits [Normal Affect] : the affect was normal [Normal Gait] : normal gait [Alert and Oriented x3] : oriented to person, place, and time [de-identified] : no obvious hand swelling

## 2020-06-23 NOTE — HISTORY OF PRESENT ILLNESS
[FreeTextEntry1] : f/u [de-identified] : \par \par cc: f/u since tele encounter on 6/9/20\par \par Feeling well.\par Fasting for labs.\par \par Last seen in office 9/19 for f/u.\par \par  \par Reports hx UC visit 1/20 for recurrence of LB spasm has had on/off x 5 yrs- given flexeril, used short term and sx's resolved since.\par \par \par hx wt gain and water retention attributed to simvastatin\par -hx  ~ 10 lbs and water retention ("bags under eyes", tight fitting shoes and rings, stomach fat gain) feels is 2/2 statin. Was taking zocor 20mg and recently made adjustments- changed to qod x 6 weeks, then 2 weeks ago changed to q 3 d with noted wt loss since doing so. Feels water retention since resolved.  Advised to d/c simvastatin at 6/9 visit- wt stable since.  Hand swells mildy on/off.  Feet swelling resolved.\par -feels diet has been unchanged since prior to starting zocor; has low salt diet\par -not meeting with  since covid pandemic, but trying to increase walking- going 4-5x/wk x 30 mins doing this since 3/20- done w/o sx's or limitations\par -home wt was 147 (in 4/20), thinks was 138 prior (per chart records 146 at 9/19 visit) at--> 142 this morning at home\par \par Feeling well otherwise. Social distancing, no sick/covid contacts. Using covid precautions.\par \par Denies fever/chills, cough, CP, sob, palpitations, dizziness, PND, orthopnea or leg swelling\par \par hx TMJ- right jaw muscle spasms, getting better\par -following with dental specialist in TMJ- has f/u 6/20\par -doing PT since 10/19- since 1/20 went 2x/wk, then stopped due to covid pandemic and restarted 2 weeks ago at 1x/wk- feels is helping\par -using dental guard\par -using ice/heat\par -hx muscle relaxer use, no longer using. No pain meds taken\par \par hx diarrhea/constipation- on/off, chronic- stable sx's\par -followed by GI\par -on Imodium prn\par -hx c-scope/EGD 11/16, told no intervention needed by GI\par -reports nl appetite; denies BRBPR, melena, n/v or abd pain. \par  \par Notes mild stress 2/2 pandemic, tolerable.\par Denies  complaints.\par \par Denies depression or anxiety. \par

## 2020-06-23 NOTE — ASSESSMENT
[FreeTextEntry1] : 63 yo F pmhx HLD, osteoporosis, migraine HAs, stress here for f/u\par \par HLD- 6/20 Tchol 264 (was 211 in 9/19)  (was 148)  (was 90) HDL 86; LFTs wnl\par -hx lipitor intolerance 2/2 nausea and dizziness \par -hx wt gain and water retention attributes to simvastatin (taking since 8/19), recently decreasing frequency of use with improved wt and retention reported. Asx otherwise. Advised to d/c simvastatin at 6/9 visit- improving sx's since.  Wt stable\par -Consider alternate statin pending sx resolution (? crestor)\par -advised low fat diet, exercise encouraged\par -advised prompt medical eval if sx's worsen or CP, sob, dizziness, etc.\par -check lipds/LFTs today\par \par hx TMJ- right jaw muscle spasms, getting better\par -following with dental specialist in TMJ- has f/u 6/20\par -doing PT \par -using dental guard\par -using ice/heat\par -hx muscle relaxer use, no longer using. No pain meds taken\par \par migraines- controlled, rare onset\par -on imitrex prn\par \par osteoporosis- \par -hx DEXA 7/19\par -followed by GYN, seen 12/18 advised f/u q 2 yrs \par -hx actonel x 5 yrs, off 8/18\par -ca/D\par -exercising\par \par hx CP/palpitations- resolved \par -followed by cardio, seen 2/19 with stress echo done told negative\par \par hx stress- mom with dementia in NH, stable per pt. Mild stress 2/2 covid pandemic.  Denies depression or anxiety.\par -declines  referral\par -info for behavioral health crisis walk in center given prior\par -reports good social support\par -advised to f/u as needed\par \par hx diarrhea/constipation- on/off, chronic- stable sx's\par -followed by GI\par -on Imodium prn\par -hx c-scope/EGD 11/16\par -6/20 cbc/TSH wnl\par -6/20 cmp wnl, except ALT 46 (nl 45)- consider repeat at next visit to determine need for US liver\par -advised to f/u if GI sx's arise\par \par hx chronic LBP- asx\par -hx UC eval 1/20 with flexeril given\par \par \par HCM\par -hx CPE 6/19, yearly advised\par -6/20 cbc/bmp/TSH/A1c/vit d wnl; screening UA no prt\par -hx negative hep C screening 5/14\par -hx negative mammo 7/19, pt declines repeat this year- wishes to do q 2 yrs.\par \par Pt's cell: [phone]\par \par Pt has prior scheduled appt for CPE 9/8/20.

## 2020-06-24 LAB
ALBUMIN SERPL ELPH-MCNC: 5.1 G/DL
ALP BLD-CCNC: 66 U/L
ALT SERPL-CCNC: 20 U/L
ANION GAP SERPL CALC-SCNC: 17 MMOL/L
AST SERPL-CCNC: 19 U/L
BILIRUB SERPL-MCNC: 0.6 MG/DL
BUN SERPL-MCNC: 16 MG/DL
CALCIUM SERPL-MCNC: 10.3 MG/DL
CHLORIDE SERPL-SCNC: 100 MMOL/L
CHOLEST SERPL-MCNC: 322 MG/DL
CHOLEST/HDLC SERPL: 3.7 RATIO
CO2 SERPL-SCNC: 23 MMOL/L
CREAT SERPL-MCNC: 1.06 MG/DL
GLUCOSE SERPL-MCNC: 101 MG/DL
HDLC SERPL-MCNC: 88 MG/DL
LDLC SERPL CALC-MCNC: 201 MG/DL
POTASSIUM SERPL-SCNC: 4.3 MMOL/L
PROT SERPL-MCNC: 7.7 G/DL
SODIUM SERPL-SCNC: 140 MMOL/L
TRIGL SERPL-MCNC: 163 MG/DL

## 2020-09-07 NOTE — PAST MEDICAL HISTORY
[Menopause Age____] : age at menopause was [unfilled] [Total Preg ___] : G: [unfilled] [AB Spont ___] : miscarriage(s): [unfilled] [Living ___] : Living: [unfilled]

## 2020-09-08 ENCOUNTER — APPOINTMENT (OUTPATIENT)
Dept: INTERNAL MEDICINE | Facility: CLINIC | Age: 65
End: 2020-09-08
Payer: COMMERCIAL

## 2020-09-08 VITALS
OXYGEN SATURATION: 98 % | HEART RATE: 71 BPM | SYSTOLIC BLOOD PRESSURE: 150 MMHG | TEMPERATURE: 98.1 F | BODY MASS INDEX: 26.68 KG/M2 | HEIGHT: 62 IN | DIASTOLIC BLOOD PRESSURE: 80 MMHG | WEIGHT: 145 LBS

## 2020-09-08 VITALS — HEART RATE: 66 BPM | RESPIRATION RATE: 14 BRPM | SYSTOLIC BLOOD PRESSURE: 140 MMHG | DIASTOLIC BLOOD PRESSURE: 72 MMHG

## 2020-09-08 DIAGNOSIS — R06.00 DYSPNEA, UNSPECIFIED: ICD-10-CM

## 2020-09-08 DIAGNOSIS — Z87.898 PERSONAL HISTORY OF OTHER SPECIFIED CONDITIONS: ICD-10-CM

## 2020-09-08 PROCEDURE — 90686 IIV4 VACC NO PRSV 0.5 ML IM: CPT

## 2020-09-08 PROCEDURE — G0444 DEPRESSION SCREEN ANNUAL: CPT

## 2020-09-08 PROCEDURE — 93000 ELECTROCARDIOGRAM COMPLETE: CPT

## 2020-09-08 PROCEDURE — G0008: CPT

## 2020-09-08 PROCEDURE — 99396 PREV VISIT EST AGE 40-64: CPT | Mod: 25

## 2020-09-08 NOTE — HEALTH RISK ASSESSMENT
[No falls in past year] : Patient reported no falls in the past year [0] : 2) Feeling down, depressed, or hopeless: Not at all (0) [Patient reported mammogram was normal] : Patient reported mammogram was normal [Patient reported colonoscopy was normal] : Patient reported colonoscopy was normal [HIV test declined] : HIV test declined [Patient reported PAP Smear was normal] : Patient reported PAP Smear was normal [Fully functional (bathing, dressing, toileting, transferring, walking, feeding)] : Fully functional (bathing, dressing, toileting, transferring, walking, feeding) [Fully functional (using the telephone, shopping, preparing meals, housekeeping, doing laundry, using] : Fully functional and needs no help or supervision to perform IADLs (using the telephone, shopping, preparing meals, housekeeping, doing laundry, using transportation, managing medications and managing finances) [Smoke Detector] : smoke detector [Carbon Monoxide Detector] : carbon monoxide detector [Seat Belt] :  uses seat belt [Sunscreen] : uses sunscreen [With Patient/Caregiver] : With Patient/Caregiver [Designated Healthcare Proxy] : Designated healthcare proxy [Relationship: ___] : Relationship: [unfilled] [Name: ___] : Health Care Proxy's Name: [unfilled]  [[id number]] : 0 [Guns at Home] : no guns at home [MammogramDate] : 07/19 [PapSmearDate] : 12/18 [BoneDensityDate] : 07/19 [ColonoscopyDate] : 11/16 [BoneDensityComments] : osteoporosis [ColonoscopyComments] : hemorrhoids, nl colon, rec: repeat in 5 yrs (Dr. Templeton) [HepatitisCDate] : 05/14 [HepatitisCComments] : negative [AdvancecareDate] : 09/2020

## 2020-09-08 NOTE — REVIEW OF SYSTEMS
[Negative] : Neurological [Suicidal] : not suicidal [FreeTextEntry9] : see HPI [de-identified] : see HPI

## 2020-09-08 NOTE — PHYSICAL EXAM
[Well-Appearing] : well-appearing [No Acute Distress] : no acute distress [Normal Sclera/Conjunctiva] : normal sclera/conjunctiva [Normal Outer Ear/Nose] : the outer ears and nose were normal in appearance [Normal Oropharynx] : the oropharynx was normal [Normal TMs] : both tympanic membranes were normal [Normal Nasal Mucosa] : the nasal mucosa was normal [Supple] : supple [Thyroid Normal, No Nodules] : the thyroid was normal and there were no nodules present [No Respiratory Distress] : no respiratory distress  [Clear to Auscultation] : lungs were clear to auscultation bilaterally [Normal Rate] : normal rate  [Regular Rhythm] : with a regular rhythm [Normal S1, S2] : normal S1 and S2 [No Murmur] : no murmur heard [No Edema] : there was no peripheral edema [Pedal Pulses Present] : the pedal pulses are present [Soft] : abdomen soft [Non Tender] : non-tender [No HSM] : no HSM [Normal Supraclavicular Nodes] : no supraclavicular lymphadenopathy [Normal Anterior Cervical Nodes] : no anterior cervical lymphadenopathy [Normal Posterior Cervical Nodes] : no posterior cervical lymphadenopathy [No Spinal Tenderness] : no spinal tenderness [No CVA Tenderness] : no CVA  tenderness [Grossly Normal Strength/Tone] : grossly normal strength/tone [No Joint Swelling] : no joint swelling [No Rash] : no rash [Normal Gait] : normal gait [No Focal Deficits] : no focal deficits [Normal Affect] : the affect was normal [Alert and Oriented x3] : oriented to person, place, and time [de-identified] : no TMJ tenderness

## 2020-09-08 NOTE — ASSESSMENT
[FreeTextEntry1] : 64 yo F pmhx HLD, osteoporosis, migraine HAs, stress here for CPE\par \par HLD, overweight- 6/20 Tchol 322   HDL 88; LFTs wnl\par -screening EKG: NSR @ 63 bpm, nl axis, no LVH/path Q/ST chgs (to be scanned into chart)\par -hx lipitor intolerance 2/2 nausea and dizziness \par -hx wt gain and water retention attributes to simvastatin (taking since 8/19), recently decreasing frequency of use with improved wt and retention reported. Asx otherwise. Advised to d/c simvastatin at 6/9/20 visit\par -on Crestor since 6/20, tolerating w/o SEs\par -advised low fat diet, exercise encouraged\par -check lipds/LFTs today\par \par hx TMJ- right jaw muscle spasms, getting better\par -following with dental specialist in TMJ- had f/u 6/20\par -doing PT as able \par -using dental guard\par -using ice prn\par -hx muscle relaxer use, to cont prn \par -Tylenol prn\par \par migraines- controlled, rare onset\par -on imitrex prn\par \par osteoporosis- \par -hx DEXA 7/19\par -followed by GYN, seen 12/18 advised f/u q 2 yrs .  Referral for new given per request.\par -hx actonel x 5 yrs, off 8/18 for holiday- wishes to f/u with GYN\par -ca/D\par -exercising encouraged\par \par hx CP/palpitations- resolved \par -followed by cardio, seen 2/19 with stress echo done told negative\par \par hx stress- mom with dementia in NH, stable per pt. Mild stress 2/2 covid pandemic.  Denies depression or anxiety.\par -declines  referral\par -info for behavioral health crisis walk in center given prior\par -reports good social support\par -advised to f/u as needed\par \par hx diarrhea/constipation- on/off, chronic- stable sx's\par -followed by GI\par -on Imodium prn\par -hx c-scope/EGD 11/16\par -6/20 cbc/TSH wnl\par -6/20 cmp wnl, except ALT 46 (nl 45)- consider repeat at next visit to determine need for US liver\par -advised to f/u if GI sx's arise\par \par hx chronic LBP- asx\par -hx UC eval 1/20 with flexeril given\par \par \par MISC:  Continued social distancing and measure for covid19 prevention encouraged.  \par -declines check covid19 AB screen.\par \par \par \par HCM\par -check screening labs; declines HIV/STD screening\par -6/20 cbc/bmp/TSH/A1c/vit d wnl; screening UA no prt\par -hx negative hep C screening 5/14\par -hx negative mammo 7/19, pt declines repeat this year- wishes to do q 2 yrs (due 2021)\par -hx negative PAP 12/18 by GYN per pt, requests referral for new- given\par -yearly derm screening advised.  Regular use of sun block for skin cancer prevention advised.\par -yearly eye and dental screening advised\par -reports HCP: sister, Clotilde Bocanegra, cell: [phone].  Asked to provide copy of paperwork for records.\par \par Pt's cell: [phone]

## 2020-09-08 NOTE — HISTORY OF PRESENT ILLNESS
[Health Maintenance] : health maintenance [Never Smoked Cigarettes] : has never smoked cigarettes [___ Drinks/Day] : drinking [unfilled] drinks per day [Occasional Use] : occasional alcohol use [Never] : has never used illicit drugs [Reg. Dental Visits] : She has regular dental visits [Good] : good [Healthy Diet] : She consumes a diverse and healthy diet [Regular Exercise] : She exercises regularly [Postmenopausal] : the patient is postmenopausal [FreeTextEntry1] : CPE [Binge Drinking] : denies binge drinking [Family Concern] : no family concern about alcohol use [Patient Concern] : no personal concern about alcohol use [Hearing Loss] : She denies hearing loss [Vision Problems] : She denies vision problems [de-identified] : 6/19, Dr. Alexis [de-identified] : adult daughter [de-identified] :  [de-identified] : retired [de-identified] : declines flu shots, hx Tdap 2011, no hx shingles vaccine [de-identified] : \par Feeling well.\par Needs med refills.\par Fasting for labs.\par \par \par hx wt gain and water retention attributed to simvastatin- notes has resolved\par -hx  ~ 10 lbs and water retention ("bags under eyes", tight fitting shoes and rings, stomach fat gain) feels is 2/2 statin. Was taking zocor 20mg and recently made adjustments- changed to qod x 6 weeks, then 2 weeks ago changed to q 3 d with noted wt loss since doing so. Feels water retention since resolved.  Advised to d/c simvastatin at 6/9/20 visit- wt stable since.  Hand swells mildly on/off.  Feet swelling resolved.\par -feels diet has been unchanged since prior to starting zocor; has low salt diet\par -not meeting with  since covid pandemic, but trying to increase walking- going 4-5x/wk x 30 mins doing this since 3/20- done w/o sx's or limitations\par -home wt was 147 (in 4/20), thinks was 138 prior (per chart records 146 at 9/19 visit) at-->  ranging 142-144 at home\par \par Feeling well otherwise, except noted stres 2/2 pandemic concerns.\par Is socially distancing, no sick/covid contacts. Using covid precautions.\par \par Denies fever/chills, cough, CP, sob, palpitations, dizziness, PND, orthopnea or leg swelling\par \par hx TMJ- right jaw muscle spasms, getting better\par -following with dental specialist in TMJ- had f/u 6/20\par -doing PT on/off, is helping.  Also does on own at home\par -using dental guard\par -using ice with help\par -hx muscle relaxer use prn\par -using Tylenol sparingly\par \par hx diarrhea/constipation- on/off, chronic- stable sx's\par -followed by GI\par -on Imodium prn\par -hx c-scope/EGD 11/16, told no intervention needed by GI\par -reports nl appetite; denies BRBPR, melena, n/v or abd pain. \par  \par Notes mild stress 2/2 pandemic, tolerable.\par \par Denies depression or anxiety. \par \par Denies  complaints.

## 2020-09-11 LAB
ALBUMIN SERPL ELPH-MCNC: 5 G/DL
ALP BLD-CCNC: 67 U/L
ALT SERPL-CCNC: 42 U/L
ANION GAP SERPL CALC-SCNC: 14 MMOL/L
AST SERPL-CCNC: 34 U/L
BILIRUB SERPL-MCNC: 0.5 MG/DL
BUN SERPL-MCNC: 15 MG/DL
CALCIUM SERPL-MCNC: 10.3 MG/DL
CHLORIDE SERPL-SCNC: 102 MMOL/L
CHOLEST SERPL-MCNC: 222 MG/DL
CHOLEST/HDLC SERPL: 2.6 RATIO
CO2 SERPL-SCNC: 24 MMOL/L
CREAT SERPL-MCNC: 0.95 MG/DL
GLUCOSE SERPL-MCNC: 89 MG/DL
HDLC SERPL-MCNC: 84 MG/DL
LDLC SERPL CALC-MCNC: 107 MG/DL
POTASSIUM SERPL-SCNC: 4.1 MMOL/L
PROT SERPL-MCNC: 7.7 G/DL
SODIUM SERPL-SCNC: 140 MMOL/L
TRIGL SERPL-MCNC: 150 MG/DL

## 2020-12-07 ENCOUNTER — EMERGENCY (EMERGENCY)
Facility: HOSPITAL | Age: 65
LOS: 1 days | Discharge: HOME | End: 2020-12-07
Attending: EMERGENCY MEDICINE
Payer: COMMERCIAL

## 2020-12-07 ENCOUNTER — NON-APPOINTMENT (OUTPATIENT)
Age: 65
End: 2020-12-07

## 2020-12-07 VITALS
OXYGEN SATURATION: 98 % | HEART RATE: 114 BPM | SYSTOLIC BLOOD PRESSURE: 181 MMHG | DIASTOLIC BLOOD PRESSURE: 83 MMHG | HEIGHT: 62 IN | WEIGHT: 145.06 LBS | TEMPERATURE: 98 F | RESPIRATION RATE: 22 BRPM

## 2020-12-07 VITALS
OXYGEN SATURATION: 97 % | HEART RATE: 83 BPM | SYSTOLIC BLOOD PRESSURE: 148 MMHG | TEMPERATURE: 98 F | DIASTOLIC BLOOD PRESSURE: 78 MMHG | RESPIRATION RATE: 18 BRPM

## 2020-12-07 LAB
ALBUMIN SERPL ELPH-MCNC: 5.2 G/DL — HIGH (ref 3.3–5)
ALP SERPL-CCNC: 82 U/L — SIGNIFICANT CHANGE UP (ref 40–120)
ALT FLD-CCNC: 67 U/L — HIGH (ref 10–45)
ANION GAP SERPL CALC-SCNC: 15 MMOL/L — SIGNIFICANT CHANGE UP (ref 5–17)
APTT BLD: 29.9 SEC — SIGNIFICANT CHANGE UP (ref 27.5–35.5)
AST SERPL-CCNC: 48 U/L — HIGH (ref 10–40)
BASE EXCESS BLDV CALC-SCNC: -1.4 MMOL/L — SIGNIFICANT CHANGE UP (ref -2–2)
BASOPHILS # BLD AUTO: 0.05 K/UL — SIGNIFICANT CHANGE UP (ref 0–0.2)
BASOPHILS NFR BLD AUTO: 0.4 % — SIGNIFICANT CHANGE UP (ref 0–2)
BILIRUB SERPL-MCNC: 0.7 MG/DL — SIGNIFICANT CHANGE UP (ref 0.2–1.2)
BUN SERPL-MCNC: 15 MG/DL — SIGNIFICANT CHANGE UP (ref 7–23)
CA-I SERPL-SCNC: 1.19 MMOL/L — SIGNIFICANT CHANGE UP (ref 1.12–1.3)
CALCIUM SERPL-MCNC: 10.7 MG/DL — HIGH (ref 8.4–10.5)
CHLORIDE BLDV-SCNC: 110 MMOL/L — HIGH (ref 96–108)
CHLORIDE SERPL-SCNC: 102 MMOL/L — SIGNIFICANT CHANGE UP (ref 96–108)
CO2 BLDV-SCNC: 23 MMOL/L — SIGNIFICANT CHANGE UP (ref 22–30)
CO2 SERPL-SCNC: 23 MMOL/L — SIGNIFICANT CHANGE UP (ref 22–31)
CREAT SERPL-MCNC: 0.93 MG/DL — SIGNIFICANT CHANGE UP (ref 0.5–1.3)
EOSINOPHIL # BLD AUTO: 0.29 K/UL — SIGNIFICANT CHANGE UP (ref 0–0.5)
EOSINOPHIL NFR BLD AUTO: 2 % — SIGNIFICANT CHANGE UP (ref 0–6)
GAS PNL BLDV: 139 MMOL/L — SIGNIFICANT CHANGE UP (ref 135–145)
GAS PNL BLDV: SIGNIFICANT CHANGE UP
GLUCOSE BLDV-MCNC: 159 MG/DL — HIGH (ref 70–99)
GLUCOSE SERPL-MCNC: 127 MG/DL — HIGH (ref 70–99)
HCO3 BLDV-SCNC: 22 MMOL/L — SIGNIFICANT CHANGE UP (ref 21–29)
HCT VFR BLD CALC: 45.3 % — HIGH (ref 34.5–45)
HCT VFR BLDA CALC: 43 % — SIGNIFICANT CHANGE UP (ref 39–50)
HGB BLD CALC-MCNC: 14 G/DL — SIGNIFICANT CHANGE UP (ref 11.5–15.5)
HGB BLD-MCNC: 14.7 G/DL — SIGNIFICANT CHANGE UP (ref 11.5–15.5)
IMM GRANULOCYTES NFR BLD AUTO: 0.5 % — SIGNIFICANT CHANGE UP (ref 0–1.5)
INR BLD: 0.97 RATIO — SIGNIFICANT CHANGE UP (ref 0.88–1.16)
LACTATE BLDV-MCNC: 2.5 MMOL/L — HIGH (ref 0.7–2)
LACTATE BLDV-MCNC: 3.6 MMOL/L — HIGH (ref 0.7–2)
LYMPHOCYTES # BLD AUTO: 19.1 % — SIGNIFICANT CHANGE UP (ref 13–44)
LYMPHOCYTES # BLD AUTO: 2.73 K/UL — SIGNIFICANT CHANGE UP (ref 1–3.3)
MCHC RBC-ENTMCNC: 30.8 PG — SIGNIFICANT CHANGE UP (ref 27–34)
MCHC RBC-ENTMCNC: 32.5 GM/DL — SIGNIFICANT CHANGE UP (ref 32–36)
MCV RBC AUTO: 95 FL — SIGNIFICANT CHANGE UP (ref 80–100)
MONOCYTES # BLD AUTO: 0.66 K/UL — SIGNIFICANT CHANGE UP (ref 0–0.9)
MONOCYTES NFR BLD AUTO: 4.6 % — SIGNIFICANT CHANGE UP (ref 2–14)
NEUTROPHILS # BLD AUTO: 10.47 K/UL — HIGH (ref 1.8–7.4)
NEUTROPHILS NFR BLD AUTO: 73.4 % — SIGNIFICANT CHANGE UP (ref 43–77)
NRBC # BLD: 0 /100 WBCS — SIGNIFICANT CHANGE UP (ref 0–0)
OTHER CELLS CSF MANUAL: 14 ML/DL — LOW (ref 18–22)
PCO2 BLDV: 35 MMHG — SIGNIFICANT CHANGE UP (ref 35–50)
PH BLDV: 7.41 — SIGNIFICANT CHANGE UP (ref 7.35–7.45)
PLATELET # BLD AUTO: 263 K/UL — SIGNIFICANT CHANGE UP (ref 150–400)
PO2 BLDV: 41 MMHG — SIGNIFICANT CHANGE UP (ref 25–45)
POTASSIUM BLDV-SCNC: 3.8 MMOL/L — SIGNIFICANT CHANGE UP (ref 3.5–5.3)
POTASSIUM SERPL-MCNC: 4 MMOL/L — SIGNIFICANT CHANGE UP (ref 3.5–5.3)
POTASSIUM SERPL-SCNC: 4 MMOL/L — SIGNIFICANT CHANGE UP (ref 3.5–5.3)
PROT SERPL-MCNC: 8.1 G/DL — SIGNIFICANT CHANGE UP (ref 6–8.3)
PROTHROM AB SERPL-ACNC: 11.6 SEC — SIGNIFICANT CHANGE UP (ref 10.6–13.6)
RBC # BLD: 4.77 M/UL — SIGNIFICANT CHANGE UP (ref 3.8–5.2)
RBC # FLD: 12.6 % — SIGNIFICANT CHANGE UP (ref 10.3–14.5)
SAO2 % BLDV: 72 % — SIGNIFICANT CHANGE UP (ref 67–88)
SARS-COV-2 RNA SPEC QL NAA+PROBE: SIGNIFICANT CHANGE UP
SODIUM SERPL-SCNC: 140 MMOL/L — SIGNIFICANT CHANGE UP (ref 135–145)
WBC # BLD: 14.27 K/UL — HIGH (ref 3.8–10.5)
WBC # FLD AUTO: 14.27 K/UL — HIGH (ref 3.8–10.5)

## 2020-12-07 PROCEDURE — 93005 ELECTROCARDIOGRAM TRACING: CPT

## 2020-12-07 PROCEDURE — 96374 THER/PROPH/DIAG INJ IV PUSH: CPT

## 2020-12-07 PROCEDURE — U0003: CPT

## 2020-12-07 PROCEDURE — 85014 HEMATOCRIT: CPT

## 2020-12-07 PROCEDURE — 85730 THROMBOPLASTIN TIME PARTIAL: CPT

## 2020-12-07 PROCEDURE — 82803 BLOOD GASES ANY COMBINATION: CPT

## 2020-12-07 PROCEDURE — 83605 ASSAY OF LACTIC ACID: CPT

## 2020-12-07 PROCEDURE — 84295 ASSAY OF SERUM SODIUM: CPT

## 2020-12-07 PROCEDURE — 82330 ASSAY OF CALCIUM: CPT

## 2020-12-07 PROCEDURE — 99234 HOSP IP/OBS SM DT SF/LOW 45: CPT

## 2020-12-07 PROCEDURE — 82947 ASSAY GLUCOSE BLOOD QUANT: CPT

## 2020-12-07 PROCEDURE — 85018 HEMOGLOBIN: CPT

## 2020-12-07 PROCEDURE — 85610 PROTHROMBIN TIME: CPT

## 2020-12-07 PROCEDURE — 80053 COMPREHEN METABOLIC PANEL: CPT

## 2020-12-07 PROCEDURE — 99285 EMERGENCY DEPT VISIT HI MDM: CPT | Mod: 25

## 2020-12-07 PROCEDURE — 96375 TX/PRO/DX INJ NEW DRUG ADDON: CPT

## 2020-12-07 PROCEDURE — 96372 THER/PROPH/DIAG INJ SC/IM: CPT | Mod: XU

## 2020-12-07 PROCEDURE — 82435 ASSAY OF BLOOD CHLORIDE: CPT

## 2020-12-07 PROCEDURE — 84132 ASSAY OF SERUM POTASSIUM: CPT

## 2020-12-07 PROCEDURE — 85025 COMPLETE CBC W/AUTO DIFF WBC: CPT

## 2020-12-07 RX ORDER — SODIUM CHLORIDE 9 MG/ML
1000 INJECTION INTRAMUSCULAR; INTRAVENOUS; SUBCUTANEOUS ONCE
Refills: 0 | Status: COMPLETED | OUTPATIENT
Start: 2020-12-07 | End: 2020-12-07

## 2020-12-07 RX ORDER — FAMOTIDINE 10 MG/ML
20 INJECTION INTRAVENOUS ONCE
Refills: 0 | Status: COMPLETED | OUTPATIENT
Start: 2020-12-07 | End: 2020-12-07

## 2020-12-07 RX ORDER — DIPHENHYDRAMINE HCL 50 MG
50 CAPSULE ORAL ONCE
Refills: 0 | Status: COMPLETED | OUTPATIENT
Start: 2020-12-07 | End: 2020-12-07

## 2020-12-07 RX ORDER — EPINEPHRINE 0.3 MG/.3ML
0.3 INJECTION INTRAMUSCULAR; SUBCUTANEOUS ONCE
Refills: 0 | Status: COMPLETED | OUTPATIENT
Start: 2020-12-07 | End: 2020-12-07

## 2020-12-07 RX ORDER — SODIUM CHLORIDE 9 MG/ML
500 INJECTION INTRAMUSCULAR; INTRAVENOUS; SUBCUTANEOUS ONCE
Refills: 0 | Status: COMPLETED | OUTPATIENT
Start: 2020-12-07 | End: 2020-12-07

## 2020-12-07 RX ADMIN — SODIUM CHLORIDE 1000 MILLILITER(S): 9 INJECTION INTRAMUSCULAR; INTRAVENOUS; SUBCUTANEOUS at 17:27

## 2020-12-07 RX ADMIN — EPINEPHRINE 0.3 MILLIGRAM(S): 0.3 INJECTION INTRAMUSCULAR; SUBCUTANEOUS at 06:47

## 2020-12-07 RX ADMIN — SODIUM CHLORIDE 1000 MILLILITER(S): 9 INJECTION INTRAMUSCULAR; INTRAVENOUS; SUBCUTANEOUS at 15:57

## 2020-12-07 RX ADMIN — Medication 50 MILLIGRAM(S): at 06:46

## 2020-12-07 RX ADMIN — Medication 60 MILLIGRAM(S): at 06:46

## 2020-12-07 RX ADMIN — FAMOTIDINE 20 MILLIGRAM(S): 10 INJECTION INTRAVENOUS at 06:46

## 2020-12-07 NOTE — ED ADULT TRIAGE NOTE - CHIEF COMPLAINT QUOTE
allergic reaction to unknown allergen now c/o voice changes, tongue swelling and throat closing. Pt noted to have muffled voice in triage.   Denies any changes in diet/medications.

## 2020-12-07 NOTE — ED CDU PROVIDER INITIAL DAY NOTE - ENMT, MLM
Airway patent. Trachea is midline. Nasal mucosa clear. Mouth with normal mucosa. +minimal sublingual and tongue swelling. Soft sublingual space. Throat has no vesicles, no oropharyngeal exudates and uvula is midline. No drooling or stridor.

## 2020-12-07 NOTE — ED CDU PROVIDER INITIAL DAY NOTE - DETAILS
Angioedema  -Tele  -frequent vitals and re-evaluations  -Monitor for reoccurrence  D/w ED attending Dr. Heath

## 2020-12-07 NOTE — ED CDU PROVIDER DISPOSITION NOTE - NSFOLLOWUPINSTRUCTIONS_ED_ALL_ED_FT
1. Follow up with your PMD within 48-72 hours.    2. Show copies of your labs provided.  Recommend consult with an Allergist. Take Prednisone 50mg daily for 4 days, Pepcid 20mg 2x/day for 4 days, Benadryl 25mg every 8 hours for 4 days- caution drowsiness/do not drive.  3. If you develop any new/worsening symptoms or new shortness of breath, tightness in your throat, swelling, chest pain, fever, chills, return to the ER. 1. Follow up with your PMD within 48-72 hours.    2. Show copies of your labs provided.  Recommend consult with an Allergist. Take Prednisone 50 mg daily for 4 days, Pepcid 20mg 2x/day for 4 days, Benadryl 25mg every 8 hours for 4 days- caution drowsiness/do not drive.  3. If you develop any new/worsening symptoms or new shortness of breath, tightness in your throat, swelling, chest pain, fever, chills, return to the Emergency Department IMMEDIATELY. 1. Follow up with your PMD within 48-72 hours.      2. Show copies of your labs provided.  Recommend consult with an Allergist. Take Prednisone 50 mg daily for 4 days, Pepcid 20mg 2x/day for 4 days, Benadryl 25mg every 8 hours for 4 days- caution drowsiness/do not drive.    3. If you develop any new/worsening symptoms or new shortness of breath, tightness in your throat, swelling, chest pain, fever, chills, return to the Emergency Department IMMEDIATELY.

## 2020-12-07 NOTE — ED ADULT NURSE NOTE - OBJECTIVE STATEMENT
65 year old female presenting to ED, from home, c/o allergic reaction. Pt reports swollen tongue that woke her from sleep at 0200, however thought it was her mouthguard. Later at 0500, pt realized it wasn't her mouthguard and took a Claritin, with minimal relief. Upon assessment, pt presents with angioedema, however well-appearing otherwise. Pt reports taking new OTC Nature Bounty anxiety pill yesterday, one pill. Pt otherwise denies new foods or agents that would cause an allergic reaction. Pt also denies headache, dizziness, chest pain, SOB, N/V, abdominal pain. 65 year old female presenting to ED, from home, c/o allergic reaction. Pt reports swollen tongue that woke her from sleep at 0200, however thought it was her mouthguard. Later at 0500, pt realized it wasn't her mouthguard and took a Claritin, with minimal relief. Upon assessment, pt presents with angioedema, however well-appearing otherwise. Pt reports taking new OTC Nature Bounty anxiety pill yesterday, one pill. Pt otherwise denies new foods or agents that would cause an allergic reaction. Pt also denies headache, dizziness, chest pain, SOB, N/V, abdominal pain. EKG completed, pt attached to cardiac monitor.

## 2020-12-07 NOTE — ED CDU PROVIDER DISPOSITION NOTE - NSFOLLOWUPCLINICS_GEN_ALL_ED_FT
[facility] Allergy and Immunology  Allergy  [address] [place] [address]  Phone: [phone]  Fax:   Follow Up Time:

## 2020-12-07 NOTE — ED CDU PROVIDER INITIAL DAY NOTE - MEDICAL DECISION MAKING DETAILS
south - angioedema responding ot steroids and antihismine s/p epi -- will monitor for recuurence appears to be allergic -- would dc on steroids and w epi pen

## 2020-12-07 NOTE — ED CDU PROVIDER INITIAL DAY NOTE - OBJECTIVE STATEMENT
66 yo female PMHx HLD presents to the ED c/o tongue swelling and neck swelling that began at 2AM. Pt woke up and first felt tongue was swelling up, went back to sleep and woke up again around 5AM and noticed neck was selling, took a Claritin w/ mild improvement of tongue swelling but given neck swelling presented to ED. Did take an OTC herbal anxiety pill yesterday afternoon which she's taken before w/o reaction. Denies other new exposure, recent illness, fever, cough, sob, chest pain, chest tightness, difficulty breathing, rash, new foods, change in medication, recent travel.    In ED pt was given Benadryl, epipen, pepcid and solumedrol for tongue and sublingual swelling with improvement in symptoms. Given improvement, ED team felt likely allergic in nature, pt sent to CDU for continued monitoring, frequent vital signs, and monitoring for reoccurrence. Case d/w ED attending Dr. Heath.

## 2020-12-07 NOTE — ED PROVIDER NOTE - PHYSICAL EXAMINATION
PGY3/MD Jose Roberto.   VITALS: reviewed  GEN: No apparent distress, A & O x 4  HEAD/EYES: NC/AT, anicteric sclerae, no conjunctival pallor  ENT: mucus membranes moist, trachea midline, no JVD, + sublingueal welling, erythematous, patent airway, making full sentences  RESP: lungs CTA with equal breath sounds bilaterally, chest wall nontender and atraumatic  CV: tachy, rhythm S1, S2, no murmur; distal pulses intact and symmetric bilaterally  ABDOMEN: normoactive bowel sounds, soft, nondistended, nontender  MSK: extremities atraumatic and nontender, no edema, no asymmetry.  SKIN: warm, dry, no rash, no bruising, no cyanosis. color appropriate for ethnicity  NEURO: alert, mentating appropriately, no facial asymmetry.  PSYCH: Affect appropriate

## 2020-12-07 NOTE — ED CDU PROVIDER DISPOSITION NOTE - CLINICAL COURSE
64 yo female PMHx HLD presents to the ED c/o tongue swelling and neck swelling that began at 2AM. Pt woke up and first felt tongue was swelling up, went back to sleep and woke up again around 5AM and noticed neck was selling, took a Claritin w/ mild improvement of tongue swelling but given neck swelling presented to ED. Did take an OTC herbal anxiety pill yesterday afternoon which she's taken before w/o reaction. Denies other new exposure, recent illness, fever, cough, sob, chest pain, chest tightness, difficulty breathing, rash, new foods, change in medication, recent travel.  ED Course: Pt noted to have swelling of the tongue and sublingual space concerning for angioedema. pt was given Benadryl, epipen, pepcid and solumedrol for tongue and sublingual swelling with improvement in symptoms. Given improvement, ED team felt likely allergic in nature, pt sent to CDU for continued monitoring, frequent vital signs, and monitoring for reoccurrence. Pt's symptoms improved while in the CDU with no re-occurrence of symptoms. Despite PO fluids and 500 cc bolus of IVF lactate increased to 3.4 from 3.0 with continued reassuring clinical exam and resolution of symptoms. Case was d/w ED CDU attending who recommended an additional 1 L IVF and repeat lactate. Repeat lactate after the liter was _________. 66 yo female PMHx HLD presents to the ED c/o tongue swelling and neck swelling that began at 2AM. Pt woke up and first felt tongue was swelling up, went back to sleep and woke up again around 5AM and noticed neck was selling, took a Claritin w/ mild improvement of tongue swelling but given neck swelling presented to ED. Did take an OTC herbal anxiety pill yesterday afternoon which she's taken before w/o reaction. Denies other new exposure, recent illness, fever, cough, sob, chest pain, chest tightness, difficulty breathing, rash, new foods, change in medication, recent travel.  ED Course: Pt noted to have swelling of the tongue and sublingual space concerning for angioedema. pt was given Benadryl, epipen, pepcid and solumedrol for tongue and sublingual swelling with improvement in symptoms. Given improvement, ED team felt likely allergic in nature, pt sent to CDU for continued monitoring, frequent vital signs, and monitoring for reoccurrence. Pt's symptoms improved while in the CDU with no re-occurrence of symptoms. Despite PO fluids and 500 cc bolus of IVF lactate increased to 3.4 from 3.0 with continued reassuring clinical exam and resolution of symptoms. Case was d/w ED CDU attending who recommended an additional 1 L IVF and repeat lactate. Repeat lactate after the liter was 2.5. All symptoms resolved. steroids sent to pharmacy. Pt evaluated by Dr. Bay prior to DC. stable prior to DC.

## 2020-12-07 NOTE — ED CDU PROVIDER DISPOSITION NOTE - PATIENT PORTAL LINK FT
You can access the FollowMyHealth Patient Portal offered by [facility] by registering at the following website: [email or web address]. By joining [employer]’s FollowMyHealth portal, you will also be able to view your health information using other applications (apps) compatible with our system.

## 2020-12-07 NOTE — ED PROVIDER NOTE - OBJECTIVE STATEMENT
PGY3/MD Jose Roberto. 64 yo F with HLD, p/w tongue swelling, noticed this morning 2a, got worsen. Pt took craritine around 5a, no improvement, noticed neck swelling. Pt took over the counter anxiety medication last night, otherwise no change in medication or food. No chest pain, fever ,chsill or cough. has penicillin allergy, bodyly flash.

## 2020-12-07 NOTE — ED PROVIDER NOTE - ATTENDING CONTRIBUTION TO CARE
Attending MD La:  I personally have seen and examined this patient.  Resident note reviewed and agree on plan of care and except where noted.  See HPI, PE, and MDM for details.

## 2020-12-07 NOTE — ED PROVIDER NOTE - CLINICAL SUMMARY MEDICAL DECISION MAKING FREE TEXT BOX
Attending MD La:  65F with no major pmh presenting with tongue swelling x hours, patient arrives with sublingual edema but no stridor or resp distress c/w angioedema. No obvious allergic precipitant (did take a vitamine she hasn't taken 1 day prior), no ACE/ARB medications so possible idiopathic angioedema. Nonetheless will treat with IM epi, IV antihistamines, steroids, close reassessment        *The above represents an initial assessment/impression. Please refer to progress notes for potential changes in patient clinical course*

## 2020-12-07 NOTE — ED ADULT NURSE REASSESSMENT NOTE - NS ED NURSE REASSESS COMMENT FT1
12.15  Received the Pt from  RANDA Hugo  . Pt is Observed for Angioedema/Allergic reaction  . Received the Pt A&OX 4 obeys commands Crystal N/V/D fever chills cp SOB   Comfort care & safety measures continued  IV site looks clean & dry no signs of infiltration noted pt denies  pain IV site .  Pt is advised to call for help  call bell with in the reach pt verbalized the understanding .  . GCS 15/15 A&OX 4 PERRLA  size 3 Strong upper & lower extremities steady gait   No facial droop  No Hand Leg drop denies numbness tingling  pt has  mild tongue edema  no neck swelling noticed  Pt has no respiratory distress Continue to monitor
CDU PA Gary at bedside for eval/consult.
Patient reports feeling better at this time, denies any airway changes, states her voice sounds "typical for her". Patient VSS at this time, awaiting CDU bed.
Pt discharged as per provider. Pt verbalizes understanding to discharge orders & will follow up with PMD post discharge. IV lock removed. No bleeding noted. Pt stable upon discharge.
Received report from RANDA Enriquez. Patient resting comfortably in bed, states "my tongue feels less swollen.". Patient remains on cardiac monitor at this time, VSS. Patient to remain on cardiac monitor for observation. Patient resting in bed, side rails up, plan of care explained.
Pt received from RANDA Clements. Pt oriented to CDU & plan of care was discussed. Pt denies any difficulty breathing or swallowing. Pt denies any tongue swelling or neck swelling. Safety & comfort measures maintained. Call bell in reach. Will continue to monitor.

## 2020-12-07 NOTE — ED CDU PROVIDER INITIAL DAY NOTE - PROGRESS NOTE DETAILS
Pt just arrived in CDU. Patient seen at bedside in NAD.  VSS.  Patient resting comfortably without complaints. Feels swelling has improved. +mild residual sublingual swelling, improved from prior. No dysphagia, no stridor, no drooling. Soft sublingual space. Appears very well. Will continue to monitor. - Gary Carranza PA-C Patient seen at bedside in NAD.  VSS.  Patient resting comfortably without complaints. Swelling significantly improved, appears back to baseline. pt reports feeling much better. Lactate of 3.0 noted. Pt has been drinking fluids but d/w attending Dr. Romero, recommended small 500 cc fluid and repeating prior to d/c. Pt aware and agreeable. - ABDIEL DwyerC Lactate increased to 3.6. Pt very well appearing. Lactate increased to 3.6. Pt very well appearing. No stridor, no drooling. Swellign resolved still. D/w ED attending Dr. Romero, will give additional 1L IVF and repeat lactate. - Gary Carranza PA-C Lactate increased to 3.6. Pt very well appearing. No stridor, no drooling. Swelling resolved still. D/w ED attending Dr. Romero, will give additional 1L IVF and repeat lactate. - Gary Carranza PA-C CDU PROGRESS NOTE GARTH SAHU: Pt resting comfortably without complaint. NAD. VSS. airway patent without stridor/drooling, swelling noted earlier resolved. no events on tele. pending repeat lactate results, likely DC Will continue to monitor. pt feeling back to usual self.  only complaint is soreness under tongue.  no swelling, diff breathing, lightheadedness, palp, itching.  pt is stable for d/c with benadryl and prednisone. CDU PROGRESS NOTE GARTH SAHU: Pt resting comfortably without complaint. NAD. VSS. airway patent without stridor/drooling, swelling noted earlier resolved. no events on tele. repeat lactate 2.5, downtrending, likely DC Will continue to monitor.

## 2020-12-07 NOTE — ED PROVIDER NOTE - PROGRESS NOTE DETAILS
Gabby Concepcion M.D. Resident  Pt signed out to me pending AM CDU to see at 9AM. Pt reassessed, in bed, comfortable on RA, no impending airway compromise, handling oral secretions, agreeable to staying in CUD. south pt endorsed to me at signout - pending cdu eval - for angioedema -- pt marlkedly improved after epi, steroids and antihistamine - likely allergic in etiology - will place in cdu

## 2020-12-08 ENCOUNTER — APPOINTMENT (OUTPATIENT)
Dept: INTERNAL MEDICINE | Facility: CLINIC | Age: 65
End: 2020-12-08
Payer: COMMERCIAL

## 2020-12-08 ENCOUNTER — NON-APPOINTMENT (OUTPATIENT)
Age: 65
End: 2020-12-08

## 2020-12-08 VITALS
SYSTOLIC BLOOD PRESSURE: 146 MMHG | BODY MASS INDEX: 27.07 KG/M2 | DIASTOLIC BLOOD PRESSURE: 82 MMHG | OXYGEN SATURATION: 98 % | HEART RATE: 80 BPM | TEMPERATURE: 98.1 F | WEIGHT: 148 LBS

## 2020-12-08 PROBLEM — E78.5 HYPERLIPIDEMIA, UNSPECIFIED: Chronic | Status: ACTIVE | Noted: 2020-12-07

## 2020-12-08 PROCEDURE — 99214 OFFICE O/P EST MOD 30 MIN: CPT

## 2020-12-08 PROCEDURE — 99072 ADDL SUPL MATRL&STAF TM PHE: CPT

## 2020-12-08 NOTE — HISTORY OF PRESENT ILLNESS
[FreeTextEntry8] : The patient was in the ER yesterday for 14 hours. The patient had an allergic reaction to unknown. Sunday 12/6th worke up at 2 am feeling sore on right side tongue started to hurt - she weres mouth guard - removed it got out of bed saw in mirror was sore under her tongue - did Mouth wash by 3 am her tongue started to swell she took Claritin - and by 6am her neck started to swell and close, she could not talk and beathing was heavy  so she drover to [facility] - got IV fluids , Prednisone taper , famotidine IV and Benadryl inj ans was discharged on benadryl and prednisone for 5 days - she came home last night doing well no CP , SOB or neck swelling breathing fine , able to talk well \par Night of sunday her dinner was Pork and apple - she has not used anything new - only medication she takes is Crestor \par -no new detregent , soap etc \par The patient tongue was swelled and neck swollen, not able to talk.

## 2020-12-08 NOTE — ASSESSMENT
[FreeTextEntry1] : S/p Allergic reaction to unknown substance \par -better now no s/s \par - rx for epipen given \par -referral for Allergy immunologist given \par - continue prednisone and Benadryl \par -if sob and symptoms reoccurs call or go to ER

## 2020-12-08 NOTE — PHYSICAL EXAM
[Normal] : soft, non-tender, non-distended, no masses palpated, no HSM and normal bowel sounds [de-identified] : erythema under tongue , no swelling

## 2021-01-04 ENCOUNTER — LABORATORY RESULT (OUTPATIENT)
Age: 66
End: 2021-01-04

## 2021-01-04 ENCOUNTER — APPOINTMENT (OUTPATIENT)
Dept: PEDIATRIC ALLERGY IMMUNOLOGY | Facility: CLINIC | Age: 66
End: 2021-01-04
Payer: COMMERCIAL

## 2021-01-04 VITALS
SYSTOLIC BLOOD PRESSURE: 137 MMHG | BODY MASS INDEX: 26.68 KG/M2 | DIASTOLIC BLOOD PRESSURE: 83 MMHG | TEMPERATURE: 95.3 F | OXYGEN SATURATION: 98 % | WEIGHT: 145 LBS | HEIGHT: 62 IN | HEART RATE: 74 BPM

## 2021-01-04 DIAGNOSIS — J30.9 ALLERGIC RHINITIS, UNSPECIFIED: ICD-10-CM

## 2021-01-04 PROCEDURE — 95004 PERQ TESTS W/ALRGNC XTRCS: CPT

## 2021-01-04 PROCEDURE — 99205 OFFICE O/P NEW HI 60 MIN: CPT | Mod: 25

## 2021-01-04 PROCEDURE — 99072 ADDL SUPL MATRL&STAF TM PHE: CPT

## 2021-01-04 RX ORDER — EPINEPHRINE 0.3 MG/.3ML
0.3 INJECTION INTRAMUSCULAR
Qty: 1 | Refills: 0 | Status: ACTIVE | COMMUNITY
Start: 2020-12-08

## 2021-01-04 RX ORDER — PREDNISONE 50 MG/1
50 TABLET ORAL
Qty: 4 | Refills: 0 | Status: COMPLETED | COMMUNITY
Start: 2020-12-07

## 2021-01-04 RX ORDER — FLUTICASONE PROPIONATE 50 UG/1
50 SPRAY, METERED NASAL
Qty: 1 | Refills: 1 | Status: ACTIVE | COMMUNITY
Start: 2021-01-04 | End: 1900-01-01

## 2021-01-04 NOTE — IMPRESSION
[Allergy Testing Dust Mite] : dust mites [] : molds [Allergy Testing Trees] : trees [Allergy Testing Grasses] : grasses [Allergy Testing Mixed Feathers] : feathers [Allergy Testing Cockroach] : cockroach [Allergy Testing Dog] : dog [Allergy Testing Cat] : cat [Allergy Testing Weeds] : weeds [________] : [unfilled]

## 2021-01-05 LAB
ALBUMIN SERPL ELPH-MCNC: 4.8 G/DL
ALP BLD-CCNC: 76 U/L
ALT SERPL-CCNC: 59 U/L
ANACR T: NEGATIVE
ANION GAP SERPL CALC-SCNC: 16 MMOL/L
APPLE IGE QN: 0.26 KUA/L
AST SERPL-CCNC: 41 U/L
BASOPHILS # BLD AUTO: 0.04 K/UL
BASOPHILS NFR BLD AUTO: 0.5 %
BILIRUB SERPL-MCNC: 0.6 MG/DL
BUN SERPL-MCNC: 17 MG/DL
C4 SERPL-MCNC: 32 MG/DL
CALCIUM SERPL-MCNC: 9.9 MG/DL
CHERRY IGE QN: 0.15 KUA/L
CHLORIDE SERPL-SCNC: 101 MMOL/L
CO2 SERPL-SCNC: 22 MMOL/L
CREAT SERPL-MCNC: 0.95 MG/DL
DEPRECATED APPLE IGE RAST QL: NORMAL
DEPRECATED CHERRY IGE RAST QL: NORMAL
DEPRECATED ONION IGE RAST QL: 1
DEPRECATED ORANGE IGE RAST QL: 0
DEPRECATED PEACH IGE RAST QL: NORMAL
DEPRECATED STRAWBERRY IGE RAST QL: NORMAL
EOSINOPHIL # BLD AUTO: 0.15 K/UL
EOSINOPHIL NFR BLD AUTO: 2 %
ERYTHROCYTE [SEDIMENTATION RATE] IN BLOOD BY WESTERGREN METHOD: 13 MM/HR
GLUCOSE SERPL-MCNC: 83 MG/DL
HCT VFR BLD CALC: 43.4 %
HGB BLD-MCNC: 14.3 G/DL
IMM GRANULOCYTES NFR BLD AUTO: 0.3 %
LYMPHOCYTES # BLD AUTO: 2.23 K/UL
LYMPHOCYTES NFR BLD AUTO: 30.1 %
MAN DIFF?: NORMAL
MCHC RBC-ENTMCNC: 30.9 PG
MCHC RBC-ENTMCNC: 32.9 GM/DL
MCV RBC AUTO: 93.7 FL
MONOCYTES # BLD AUTO: 0.48 K/UL
MONOCYTES NFR BLD AUTO: 6.5 %
NEUTROPHILS # BLD AUTO: 4.49 K/UL
NEUTROPHILS NFR BLD AUTO: 60.6 %
ONION IGE QN: 0.35 KUA/L
ORANGE IGE QN: <0.1 KUA/L
PEACH IGE QN: 0.29 KUA/L
PLATELET # BLD AUTO: 272 K/UL
POTASSIUM SERPL-SCNC: 3.6 MMOL/L
PROT SERPL-MCNC: 7.2 G/DL
RBC # BLD: 4.63 M/UL
RBC # FLD: 12.8 %
SODIUM SERPL-SCNC: 140 MMOL/L
STRAWBERRY IGE QN: 0.1 KUA/L
TOTAL IGE SMQN RAST: 40 KU/L
TSH SERPL-ACNC: 2.41 UIU/ML
WBC # FLD AUTO: 7.41 K/UL

## 2021-01-05 NOTE — SOCIAL HISTORY
[Bedroom] :  in bedroom [Living Area] : in living area [None] : none [FreeTextEntry2] : at home [Smokers in Household] : there are no smokers in the home

## 2021-01-05 NOTE — CONSULT LETTER
[Dear  ___] : Dear  [unfilled], [Consult Letter:] : I had the pleasure of evaluating your patient, [unfilled]. [Please see my note below.] : Please see my note below. [Consult Closing:] : Thank you very much for allowing me to participate in the care of this patient.  If you have any questions, please do not hesitate to contact me. [Sincerely,] : Sincerely, [FreeTextEntry2] : Dr. HECTOR FLEMING, [FreeTextEntry3] : Justyna Terrazas MD\par Attending Physician, Division of Allergy and Immunology\par , Departments of Medicine and Pediatrics\par [employer] [facility]\par Alan Ocasio [facility] \par [facility]

## 2021-01-05 NOTE — REVIEW OF SYSTEMS
[Rhinorrhea] : rhinorrhea [Nasal Congestion] : nasal congestion [Sneezing] : sneezing [Swelling] : swelling [Nl] : Genitourinary [Received Influenza Vaccine this Past Year] : patient has received the Influenza vaccine this past year [FreeTextEntry7] : constipation

## 2021-01-05 NOTE — HISTORY OF PRESENT ILLNESS
[Asthma] : asthma [Eczematous rashes] : eczematous rashes [Venom Reactions] : venom reactions [de-identified] : 65 year old female presents in initial consultation for allergic reaction history/ angioedema, adverse drug reaction and chronic rhinitis:\par \par Patient reports h/o swelling of her tongue and neck that occurred overnight 4 weeks ago. She woke up at 2 am noticing a sore on her tongue, took of her mouth guard out, noticed progressive swelling of her tongue. She took Claritin with no relief. By 6 am her tongue and neck were very swollen and she could hardly talk, and drove to the ED. No hives, respiratory symptoms or GI symptoms.  \par In the ED she was given IM Epinephrine, Benadryl, famotidine, IVF, and methylprednisone, discharged on 5 days of prednisone. Symptoms resolved within 1 hour of receiving medications in the ED.\par The last food ingestion the day prior was at 5:30 pm, and patient denies any symptoms when she went to bed at 10 pm.\par Her dinner consisted of pork, baked potato, cauliflower, cooked apple and onion. She usually avoids onion due to a sensitive stomach. Has also avoided apple since then, but has eaten and tolerated pork, beef, potato and cauliflower since then without any notable adverse reaction. She continues on the same medications as before with no issues. Denies prior episodes or recurrent episodes of angioedema. Denies h/o random hives, although her hands sometimes feels itchy. No chronic diarrhea, but sometimes has constipation followed by loose stools.\par \par Reports h/o food allergies:\par Apple - h/o redness of the face and hives when peeling raw apples and touching her face. Was previously able to tolerate apple sauce without an issues, which has been avoided since her recent reaction.\par Strawberry and cherry - h/o hives after eating cherry and strawberry. \par Orange - h/o blisters aroound her nose with ingestion of orange juice\par Olive - h/o diarrhea \par Peach - oral pruritus with raw peach, tolerates cooked peach with no issues.\par Tolerates pears, lemon, cantaloupe, banana with no issues.\par Patient reportedly tolerates lactose free cow' milk/dairy, egg, wheat, peanut, tree nuts, soy, fish and shellfish with no notable adverse reaction. \par \par Reports h/o migraine with eucalyptus an perfumes.\par \par Reports h/o bacterial superinfection after mosquito bites, treated for cellulitis twice.\par \par Patient also reports h/o adverse drug reaction:\par Simvastatin, Lipitor - h/o nausea\par Cymbalta, Lexapro - h/o confusion and disorientation\par Penicillin - h/o redness of the face 25 years ago, one day after receiving penicillin the dentist. \par Azithromycin - h/o abdominal pain and diarrhea Feb. 2020\par \par Patient reports symptoms of nasal congestion, sneezing, takes Claritin as needed mostly during spring.\par

## 2021-01-05 NOTE — PHYSICAL EXAM
[Alert] : alert [Well Nourished] : well nourished [Healthy Appearance] : healthy appearance [No Acute Distress] : no acute distress [Well Developed] : well developed [Normal Pupil & Iris Size/Symmetry] : normal pupil and iris size and symmetry [No Discharge] : no discharge [No Photophobia] : no photophobia [Sclera Not Icteric] : sclera not icteric [Normal Outer Ear/Nose] : the ears and nose were normal in appearance [Supple] : the neck was supple [Normal Rate and Effort] : normal respiratory rhythm and effort [No Crackles] : no crackles [No Retractions] : no retractions [Bilateral Audible Breath Sounds] : bilateral audible breath sounds [Normal Rate] : heart rate was normal  [Normal S1, S2] : normal S1 and S2 [No murmur] : no murmur [Regular Rhythm] : with a regular rhythm [Not Distended] : not distended [Normal Cervical Lymph Nodes] : cervical [Skin Intact] : skin intact  [No Rash] : no rash [No Skin Lesions] : no skin lesions [No clubbing] : no clubbing [No Edema] : no edema [No Cyanosis] : no cyanosis [Normal Mood] : mood was normal [Normal Affect] : affect was normal [Alert, Awake, Oriented as Age-Appropriate] : alert, awake, oriented as age appropriate [Conjunctival Erythema] : no conjunctival erythema [Wheezing] : no wheezing was heard [Patches] : no patches

## 2021-01-06 ENCOUNTER — NON-APPOINTMENT (OUTPATIENT)
Age: 66
End: 2021-01-06

## 2021-01-07 LAB — TRYPTASE: 6.7 UG/L

## 2021-01-08 ENCOUNTER — NON-APPOINTMENT (OUTPATIENT)
Age: 66
End: 2021-01-08

## 2021-01-08 LAB — CHRONIC URTICARIA PANEL (CU INDEX): 4.1

## 2021-02-04 ENCOUNTER — NON-APPOINTMENT (OUTPATIENT)
Age: 66
End: 2021-02-04

## 2021-02-04 ENCOUNTER — APPOINTMENT (OUTPATIENT)
Dept: PEDIATRIC ALLERGY IMMUNOLOGY | Facility: CLINIC | Age: 66
End: 2021-02-04
Payer: COMMERCIAL

## 2021-02-04 VITALS — OXYGEN SATURATION: 98 % | WEIGHT: 150.8 LBS | BODY MASS INDEX: 27.58 KG/M2 | HEART RATE: 72 BPM

## 2021-02-04 DIAGNOSIS — T78.3XXD ANGIONEUROTIC EDEMA, SUBSEQUENT ENCOUNTER: ICD-10-CM

## 2021-02-04 DIAGNOSIS — T50.905D ADVERSE EFFECT OF UNSPECIFIED DRUGS, MEDICAMENTS AND BIOLOGICAL SUBSTANCES, SUBSEQUENT ENCOUNTER: ICD-10-CM

## 2021-02-04 DIAGNOSIS — Z91.018 ALLERGY TO OTHER FOODS: ICD-10-CM

## 2021-02-04 DIAGNOSIS — T78.40XD ALLERGY, UNSPECIFIED, SUBSEQUENT ENCOUNTER: ICD-10-CM

## 2021-02-04 PROCEDURE — 99214 OFFICE O/P EST MOD 30 MIN: CPT | Mod: 25

## 2021-02-04 PROCEDURE — 95018 ALL TSTG PERQ&IQ DRUGS/BIOL: CPT

## 2021-02-04 PROCEDURE — 99072 ADDL SUPL MATRL&STAF TM PHE: CPT

## 2021-02-04 NOTE — PHYSICAL EXAM
[Alert] : alert [Well Nourished] : well nourished [Healthy Appearance] : healthy appearance [No Acute Distress] : no acute distress [Well Developed] : well developed [Normal Pupil & Iris Size/Symmetry] : normal pupil and iris size and symmetry [No Discharge] : no discharge [No Photophobia] : no photophobia [Sclera Not Icteric] : sclera not icteric [Supple] : the neck was supple [Normal Rate and Effort] : normal respiratory rhythm and effort [No Crackles] : no crackles [No Retractions] : no retractions [Bilateral Audible Breath Sounds] : bilateral audible breath sounds [Normal Rate] : heart rate was normal  [Normal S1, S2] : normal S1 and S2 [No murmur] : no murmur [Regular Rhythm] : with a regular rhythm [Not Distended] : not distended [Normal Cervical Lymph Nodes] : cervical [Skin Intact] : skin intact  [No Rash] : no rash [No Skin Lesions] : no skin lesions [No clubbing] : no clubbing [No Edema] : no edema [No Cyanosis] : no cyanosis [Normal Mood] : mood was normal [Normal Affect] : affect was normal [Alert, Awake, Oriented as Age-Appropriate] : alert, awake, oriented as age appropriate [Conjunctival Erythema] : no conjunctival erythema [Wheezing] : no wheezing was heard [Patches] : no patches

## 2021-02-04 NOTE — IMPRESSION
[FreeTextEntry1] : SPT NEGATIVE to:\par PrePen, PenG, Amp and Azithromycin\par \par Intradermal testing NEGATIVE x2  to:\par PrePen, PenG, Amp and Azithromycin

## 2021-02-04 NOTE — CONSULT LETTER
[Dear  ___] : Dear  [unfilled], [Please see my note below.] : Please see my note below. [Consult Closing:] : Thank you very much for allowing me to participate in the care of this patient.  If you have any questions, please do not hesitate to contact me. [Sincerely,] : Sincerely, [Courtesy Letter:] : I had the pleasure of seeing your patient, [unfilled], in my office today. [FreeTextEntry2] : Dr. HECTOR FLEMING, [FreeTextEntry3] : Justyna Terrazas MD\par Attending Physician, Division of Allergy and Immunology\par , Departments of Medicine and Pediatrics\par [employer] [facility]\par Alan Ocasio [facility] \par [facility]

## 2021-02-04 NOTE — HISTORY OF PRESENT ILLNESS
[Asthma] : asthma [Eczematous rashes] : eczematous rashes [Venom Reactions] : venom reactions [de-identified] : 65 year old female presents with h/o angioedema (of tongue and neck) concerning for severe allergic reaction of unclear etiology, h/o food allergy/oral allergy syndrome, adverse drug reactions, and allergic rhinitis (SPT+ to dust mite, mold, tree and grass pollen), presents for follow up:\par \par Adverse drug reaction:\par Continues avoidance of penicillins, macrolides, simvastatin, Cymbalta and Lexapro:\par Simvastatin, Lipitor - h/o nausea\par Cymbalta, Lexapro - h/o confusion and disorientation\par Penicillin - h/o redness of the face 25 years ago, one day after receiving penicillin the dentist. \par Azithromycin - h/o abdominal pain and diarrhea Feb. 2020\par \par Allergic reactions:\par No further episodes of allergic reactions in the past month/since her last appointment. Reports symptoms of cancer sores without angioedema, relieved with intake of Tylenol.\par The patient's labs from 1/8/21 were notable for mildly elevated AST (41 U/L) and ALT (59 U/L).\par The remainder of her CMP, her CBC, ESR, C4, TSH, total serum IgE, tryptase and CU index were normal, DONNA was negative. \par HISTORY:\par Patient reports h/o swelling of her tongue and neck that occurred overnight 4 weeks ago. She woke up at 2 am noticing a sore on her tongue, took of her mouth guard out, noticed progressive swelling of her tongue. She took Claritin with no relief. By 6 am her tongue and neck were very swollen and she could hardly talk, and drove to the ED. No hives, respiratory symptoms or GI symptoms.  \par In the ED she was given IM Epinephrine, Benadryl, famotidine, IVF, and methylprednisone, discharged on 5 days of prednisone. Symptoms resolved within 1 hour of receiving medications in the ED.\par The last food ingestion the day prior was at 5:30 pm, and patient denies any symptoms when she went to bed at 10 pm.\par Her dinner consisted of pork, baked potato, cauliflower, cooked apple and onion. She usually avoids onion due to a sensitive stomach. Has also avoided apple since then, but has eaten and tolerated pork, beef, potato and cauliflower since then without any notable adverse reaction. She continues on the same medications as before with no issues. Denies prior episodes or recurrent episodes of angioedema. Denies h/o random hives, although her hands sometimes feels itchy. No chronic diarrhea, but sometimes has constipation followed by loose stools.\par \par Food allergies:\par ImmunoCap/food specific IgE testing was slightly positive to onion. ImmunoCaps were negative to other foods including apple, cherry, peach, orange, strawberry, black olive.\par The patient's skin test was negative to apple, peach, strawberry, orange and pineapple. Commercial extracts were not available to test for cherry and olives.\par Apple - h/o redness of the face and hives when peeling raw apples and touching her face. Was previously able to tolerate apple sauce without an issues, which has been avoided since her recent reaction.\par Strawberry and cherry - h/o hives after eating cherry and strawberry. \par Orange - h/o blisters aroound her nose with ingestion of orange juice\par Olive - h/o diarrhea \par Peach - oral pruritus with raw peach, tolerates cooked peach with no issues.\par Tolerates pears, lemon, cantaloupe, banana with no issues.\par Patient reportedly tolerates lactose free cow' milk/dairy, egg, wheat, peanut, tree nuts, soy, fish and shellfish with no notable adverse reaction. \par \par Allergic rhinitis - Uses antihistamines as needed. Not using Flonase which she feels is worsening her migraines.

## 2021-02-04 NOTE — REASON FOR VISIT
[Routine Follow-Up] : a routine follow-up visit for [FreeTextEntry2] : allergic reaction, angioedema, adverse drug reaction, chronic rhinitis

## 2021-02-12 LAB
COLLECT DURATION TIME UR: 24 H
CREAT UR-MCNC: 39 MG/DL
CREATININE, 24 HOUR URINE: 780 MG/24 H
ME-HISTAMINE/CREAT 24H UR: 76 MCG/G CR
SPECIMEN VOL 24H UR: 2000 ML

## 2021-02-16 ENCOUNTER — NON-APPOINTMENT (OUTPATIENT)
Age: 66
End: 2021-02-16

## 2021-02-16 LAB
2,3-DINOR 11B-PROSTAGLANDIN F2A, 24 HR URINE: 1595 CD:455662145
LEUKOTRIENE E4, URINE: 50 CD:455662145

## 2021-03-15 ENCOUNTER — RX RENEWAL (OUTPATIENT)
Age: 66
End: 2021-03-15

## 2021-04-16 ENCOUNTER — LABORATORY RESULT (OUTPATIENT)
Age: 66
End: 2021-04-16

## 2021-04-16 ENCOUNTER — APPOINTMENT (OUTPATIENT)
Dept: DERMATOLOGY | Facility: CLINIC | Age: 66
End: 2021-04-16
Payer: COMMERCIAL

## 2021-04-16 VITALS — WEIGHT: 145 LBS | BODY MASS INDEX: 26.68 KG/M2 | HEIGHT: 62 IN

## 2021-04-16 DIAGNOSIS — R21 RASH AND OTHER NONSPECIFIC SKIN ERUPTION: ICD-10-CM

## 2021-04-16 DIAGNOSIS — Z13.89 ENCOUNTER FOR SCREENING FOR OTHER DISORDER: ICD-10-CM

## 2021-04-16 DIAGNOSIS — D48.9 NEOPLASM OF UNCERTAIN BEHAVIOR, UNSPECIFIED: ICD-10-CM

## 2021-04-16 PROCEDURE — 99072 ADDL SUPL MATRL&STAF TM PHE: CPT

## 2021-04-16 PROCEDURE — 99203 OFFICE O/P NEW LOW 30 MIN: CPT | Mod: 25

## 2021-04-16 PROCEDURE — 11103 TANGNTL BX SKIN EA SEP/ADDL: CPT | Mod: 59

## 2021-04-16 PROCEDURE — 11102 TANGNTL BX SKIN SINGLE LES: CPT | Mod: 59

## 2021-04-16 PROCEDURE — 17000 DESTRUCT PREMALG LESION: CPT | Mod: 59

## 2021-04-16 RX ORDER — TRIAMCINOLONE ACETONIDE 1 MG/G
0.1 OINTMENT TOPICAL TWICE DAILY
Qty: 1 | Refills: 3 | Status: ACTIVE | COMMUNITY
Start: 2021-04-16 | End: 1900-01-01

## 2021-04-16 NOTE — HISTORY OF PRESENT ILLNESS
[FreeTextEntry1] : spots on body [de-identified] : 65 year old female with spots on body. some are not healing. hx of NMSC.

## 2021-04-16 NOTE — ASSESSMENT
[FreeTextEntry1] : 1) benign findings as above- education\par \par 2) eczematous patches\par TAC PRN; SED\par \par 3) Actinic keratoses as above\par -Treated with cryotherapy x 2, side effects discussed including erythema and scarring, blister formation expected by patient, total freeze time 4 seconds. Wound care reviewed withpatient. Risk of hypo/hyperpigmentation reviewed with patient, and possible need for re-treatment and / or future biopsy also reviewed with patient\par - total # treated- 1\par \par 4) Shave bx location x3; left forearm, left upper back, right neck\par diagnosis: r/o BCC\par  \par Shave biopsy performed today over above location, risks and benefits discussed including incomplete removal, not enough tissue for diagnosis scarring and infection, informed consent obtained, pictures taken,  cleaned with alcohol and anesthetized with 1%lido+epi, 0.3 cc total, hemostasis obtained with monsels, vaseline and bandaid placed, tolerated well, wound care reviewed, specimen sent to pathology.\par \par 
2

## 2021-04-16 NOTE — PHYSICAL EXAM
[FreeTextEntry3] : AAOx3, pleasant, NAD, no visual lymphadenopathy\par hair, scalp, face, nose, eyelids, ears, lips, oropharynx, neck, chest, abdomen, back, right arm, left arm, nails, and hands examined with all normal findings,\par pertinent findings include:\par \par multiple benign nevi and lentigines\par pink plaque on left forearm, left upper back and right neck\par scaling erythematous papule on chest\par eczematous patches on left arm

## 2021-04-20 ENCOUNTER — NON-APPOINTMENT (OUTPATIENT)
Age: 66
End: 2021-04-20

## 2021-04-22 LAB
ALBUMIN SERPL ELPH-MCNC: 4.8 G/DL
ALP BLD-CCNC: 78 U/L
ALT SERPL-CCNC: 44 U/L
ANION GAP SERPL CALC-SCNC: 12 MMOL/L
AST SERPL-CCNC: 31 U/L
BILIRUB SERPL-MCNC: 0.5 MG/DL
BUN SERPL-MCNC: 16 MG/DL
CALCIUM SERPL-MCNC: 10.4 MG/DL
CHLORIDE SERPL-SCNC: 102 MMOL/L
CHOLEST SERPL-MCNC: 207 MG/DL
CO2 SERPL-SCNC: 26 MMOL/L
CREAT SERPL-MCNC: 0.98 MG/DL
GLUCOSE SERPL-MCNC: 93 MG/DL
HDLC SERPL-MCNC: 86 MG/DL
LDLC SERPL CALC-MCNC: 98 MG/DL
NONHDLC SERPL-MCNC: 121 MG/DL
POTASSIUM SERPL-SCNC: 4.4 MMOL/L
PROT SERPL-MCNC: 7.6 G/DL
SODIUM SERPL-SCNC: 140 MMOL/L
TRIGL SERPL-MCNC: 113 MG/DL

## 2021-05-28 ENCOUNTER — APPOINTMENT (OUTPATIENT)
Dept: INTERNAL MEDICINE | Facility: CLINIC | Age: 66
End: 2021-05-28
Payer: COMMERCIAL

## 2021-05-28 ENCOUNTER — NON-APPOINTMENT (OUTPATIENT)
Age: 66
End: 2021-05-28

## 2021-05-28 VITALS
SYSTOLIC BLOOD PRESSURE: 134 MMHG | BODY MASS INDEX: 25.97 KG/M2 | OXYGEN SATURATION: 98 % | HEART RATE: 74 BPM | WEIGHT: 142 LBS | DIASTOLIC BLOOD PRESSURE: 78 MMHG | TEMPERATURE: 98.4 F

## 2021-05-28 DIAGNOSIS — K52.9 NONINFECTIVE GASTROENTERITIS AND COLITIS, UNSPECIFIED: ICD-10-CM

## 2021-05-28 DIAGNOSIS — Z23 ENCOUNTER FOR IMMUNIZATION: ICD-10-CM

## 2021-05-28 DIAGNOSIS — F43.9 REACTION TO SEVERE STRESS, UNSPECIFIED: ICD-10-CM

## 2021-05-28 PROCEDURE — 90471 IMMUNIZATION ADMIN: CPT

## 2021-05-28 PROCEDURE — 99214 OFFICE O/P EST MOD 30 MIN: CPT | Mod: 25

## 2021-05-28 PROCEDURE — 90715 TDAP VACCINE 7 YRS/> IM: CPT

## 2021-05-28 NOTE — ASSESSMENT
[FreeTextEntry1] : hx diarrhea/constipation- on/off, chronic- \par -start Probiotics Organic\par -Get DONNA, VIT b12 , Heavy metals- Lead, mercury , TSH \par -followed by GI\par -on Imodium prn\par -hx c-scope/EGD 11/16\par -advised to f/u if GI sx's arise\par \par HLD, [id number]  4/2021 BW reviewed controlled Nl LFT \par -hx lipitor intolerance 2/2 nausea and dizziness \par -hx wt gain and water retention attributes to simvastatin (taking since 8/19), recently decreasing frequency of use with improved wt and retention reported. Asx otherwise. Advised to d/c simvastatin at 6/9/20 visit\par -on Crestor since 6/20, tolerating w/o SEs\par -advised low fat diet, exercise encouraged\par \par hx TMJ- right jaw muscle spasms, getting better\par -following with dental specialist in TMJ- had f/u 6/20\par -doing PT as able \par -using dental guard\par -using ice prn\par -hx muscle relaxer use, to cont prn \par -Tylenol prn\par \par migraines- controlled, rare onset\par -on imitrex prn\par \par osteoporosis- \par -hx DEXA 7/19\par -followed by GYN, seen 12/18 advised f/u q 2 yrs. Referral for new given per request.\par -hx actonel x 5 yrs, off 8/18 for holiday- wishes to f/u with GYN\par -ca/D\par -exercising encouraged\par \par hx stress- mom with dementia in NH, stable per pt. Mild stress 2/2 covid pandemic. Denies depression or anxiety.\par -declines  referral\par -info for behavioral health crisis walk in center given prior\par -reports good social support\par -advised to f/u as needed\par \par hx chronic LBP- asx\par -hx UC eval 1/20 with flexeril \par \par HCM\par -hx negative hep C screening 5/14\par -hx negative mammo 7/19, pt declines repeat this year- wishes to do q 2 yrs (due 2021)\par -hx negative PAP 12/18 by GYN per pt, requests referral for new- given\par -yearly derm screening advised. Regular use of sun block for skin cancer prevention advised.\par -yearly eye and dental screening advised\par -reports HCP: sister, Clotilde Bocanegra, cell: [phone]. Asked to provide copy of paperwork for records.\par -completed Moderna vaccine 3/17/21, 4/14/21\par -Tdap 5/28/21 \par Pt's cell: [phone]. \par \par

## 2021-05-28 NOTE — HISTORY OF PRESENT ILLNESS
[de-identified] : wants to establish care - Dr Clifton left Transition of care \par \par This is a 66 yo F pmhx HLD, osteoporosis, migraine HAs, stress here for f/u \par Last CPE 9/8/2020 Dr Clifton \par \par Denies fever/chills, cough, CP, sob, palpitations, dizziness, PND, orthopnea or leg swelling\par \par Hx anxiety - since her husbands death she saw her recently yesterday - pt has been having lot of stomach issues - she told her it could be autoimmune disease - recomedned to check few things\par hx diarrhea/constipation- on/off, chronic- stable sx's\par -on Imodium prn\par -hx c-scope/EGD 11/16, told no intervention needed by GI\par -reports nl appetite; denies BRBPR, melena, n/v or abd pain.  \par -gets diarrhea - since morning she went 4 times - could be ok 10 days then 2 days cannot leave home due to diarrhea - has less control on it , starts with feeling of need to go has to pull over and go - if not she will soil , not associated with abdoment cramp / pain- saw gastro since she was in her 20's \par -does colonoscopes -due in later this ear also did EGd all nl 2016 \par -she has colitis / gastritis/ gastroenteritis / does not wake her up from sleep - only if its her bad day she will wake up at 6am to go have BM \par \par hx TMJ- right jaw muscle spasms, getting better\par -following with dental specialist in TMJ- had f/u 6/20\par -doing PT on/off, is helping. Also does on own at home\par -using dental guard\par -using ice with help\par -hx muscle relaxer use prn\par -using Tylenol sparingly\par  \par Notes mild stress 2/2 pandemic, tolerable.\par \par Denies depression or anxiety. \par \par Denies  complaints. \par

## 2021-06-01 ENCOUNTER — LABORATORY RESULT (OUTPATIENT)
Age: 66
End: 2021-06-01

## 2021-06-02 LAB
ANA SER IF-ACNC: NEGATIVE
HETEROPH AB SER QL: NEGATIVE
TSH SERPL-ACNC: 2.42 UIU/ML
TTG IGA SER IA-ACNC: <1.2 U/ML
TTG IGA SER-ACNC: NEGATIVE
TTG IGG SER IA-ACNC: 2.5 U/ML
TTG IGG SER IA-ACNC: NEGATIVE
VIT B12 SERPL-MCNC: 344 PG/ML

## 2021-06-07 LAB
ARSENIC, BLOOD: 7 UG/L
CADMIUM SERPL-MCNC: <0.5 UG/L
LEAD BLD-MCNC: <1 UG/DL
MERCURY BLD-MCNC: 2.4 UG/L

## 2021-08-18 ENCOUNTER — APPOINTMENT (OUTPATIENT)
Dept: GASTROENTEROLOGY | Facility: CLINIC | Age: 66
End: 2021-08-18
Payer: COMMERCIAL

## 2021-08-18 VITALS
OXYGEN SATURATION: 98 % | RESPIRATION RATE: 14 BRPM | DIASTOLIC BLOOD PRESSURE: 80 MMHG | WEIGHT: 142 LBS | HEIGHT: 62 IN | SYSTOLIC BLOOD PRESSURE: 134 MMHG | TEMPERATURE: 98 F | HEART RATE: 71 BPM | BODY MASS INDEX: 26.13 KG/M2

## 2021-08-18 PROCEDURE — 99203 OFFICE O/P NEW LOW 30 MIN: CPT

## 2021-08-18 NOTE — HISTORY OF PRESENT ILLNESS
[Heartburn] : denies heartburn [Nausea] : denies nausea [Vomiting] : denies vomiting [Diarrhea] : diarrhea worsened [Constipation] : denies constipation [Yellow Skin Or Eyes (Jaundice)] : denies jaundice [Abdominal Pain] : stable abdominal pain [Abdominal Swelling] : denies abdominal swelling [Rectal Pain] : denies rectal pain [Wt Loss ___ Lbs] : recent [unfilled] ~Upound(s) weight loss [_________] : Performed [unfilled] [de-identified] : This is a 65 year old female with HLD, migraines, who presents for evaluation of alternating bowel habits, diarrhea/constipation.\par \par The patient was seen by Dr. Templeton in 2016 for diarrhea. She had negative stool testing for O&P, culture, giardia, and lactoferrin, and normal ESR/CRP then. She had an EGD/colonoscopy showing  no microscopic colitis, no signs of celiac disease. She reports having alternating diarrhea/constipation for a long time now, for many years. It alternates between day of no bowel movements, vs watery diarrhea like urine. There is no blood/melena. She has a discomfort in her abdomen all the time regardless of the diarrhea/constipation, which improves after bowel movements. It feels more like pressure in the lower abdomen. No nausea/vomiting. She reports 5lbs of weight loss recently due to her mother passing away. She's tried Pepto-Bismol, Imodium, Kaopectate, which slows the diarrhea, but it is getting less effective and it is affecting her quality of life. She reports despite taking these, she has to run to the bathroom multiple times a day, and will have accidents in public places.. She drinks a lot of water. She started a probiotic recently, which does not work. She has cut out all dairy in her diet but symptoms still persists.\par \par Her usual diet consists of:\par peppermint tea, English muffin with jelly\par hard boiled egg\par Rice/pork chop/string beans.\par No dairy products\par \par 6/2/21: TSH 2.4\par Lead <1\par TTG negative\par 4/22/21: Na 140, K 4.4, Cl 102, bciarb 26, glucose 93, BUn 16, Cr 0.98, T protein 7.6, albumin 4.8, Ca 10.4, T bili 0.5, AST 31, ALT 44, ALP 78\par WBC 7.4, hgb 14.3, Hct 43.4,  [de-identified] : Findings:\par      The examined esophagus was normal.\par      The Z-line was regular and was found 38 cm from the incisors.\par      Localized mild inflammation characterized by congestion (edema) and\par      erythema was found in the gastric antrum. Biopsies were taken of the\par      gastric antrum and body with a cold forceps for Helicobacter pylori\par      testing.\par      Diffuse moderate inflammation characterized by erythema and aphthous\par      ulcerations was found in the duodenal bulb and in the second part of the\par      duodenum. Biopsies for histology were taken of the 2nd portion of the\par      duodenum and bulb with a cold forceps for evaluation of celiac disease.\par                                                                                \par Impression:          - Gastritis. Biopsied.\par                      - Duodenitis. Biopsied.\par \par Path:\par 5. Duodenum, biopsy\par - Duodenal mucosa with features of peptic duodenitis.\par - Negative for gluten-sensitive enteropathy.\par - Negative for microorganisms.\par \par 6. Stomach, biopsy\par - Gastric antral mucosa with intestinal metaplasia and\par chronic inactive gastritis.\par - Gastric oxyntic mucosa with chronic inactive gastritis.\par - Negative for Helicobacter microorganisms (Warthin-Starry\par stain).\par - Negative for dysplasia. [de-identified] : The quality of the\par                      bowel preparation was adequate.\par                                                                                \par Findings:\par      The perianal exam findings include non-thrombosed external hemorrhoids.\par      The digital rectal exam was normal. Pertinent negatives include normal\par      sphincter tone and no palpable rectal lesions.\par      Non-bleeding internal hemorrhoids were found during retroflexion. The\par      hemorrhoids were small.\par      A moderate amount of liquid stool was found in the entire colon,\par      precluding visualization. Lavage of the area was performed using copious\par      amounts of sterile water, resulting in clearance with adequate\par      visualization.\par      There is no endoscopic evidence of erythema, inflammation, mass or\par      polyps in the entire colon. Random biopsies were taken of the ascending\par      colon, transverse colon, descending colon, and sigmoid colon to evaluate\par      for microscopic colitis.\par      No other significant abnormalities were identified in a careful\par      examination of the remainder of the colon.\par                                                                                \par Impression:          - Non-thrombosed external hemorrhoids found on perianal\par                      exam.\par                      - Non-bleeding internal hemorrhoids.\par                      - Normal colonoscopy. Random biopsies were taken of the\par                      ascending colon, transverse colon, descending colon, and\par                      sigmoid colon to evaluate for microscopic colitis.\par \par Path:\par 1. Colon, ascending, biopsy\par - Colonic mucosa with no significant diagnostic alterations.\par \par 2. Colon, transverse, biopsy\par - Colonic mucosa with no significant diagnostic alterations.\par \par 3. Colon, descending, biopsy\par - Colonic mucosa with no significant diagnostic alterations.\par \par 4. Colon, sigmoid, biopsy\par - Colonic mucosa with no significant diagnostic alterations.

## 2021-08-18 NOTE — REVIEW OF SYSTEMS
[Fever] : no fever [Chills] : no chills [Recent Weight Loss (___ Lbs)] : no recent weight loss [As Noted in HPI] : as noted in HPI [Negative] : Heme/Lymph

## 2021-08-18 NOTE — PHYSICAL EXAM
[General Appearance - Alert] : alert [General Appearance - In No Acute Distress] : in no acute distress [Sclera] : the sclera and conjunctiva were normal [Outer Ear] : the ears and nose were normal in appearance [Neck Appearance] : the appearance of the neck was normal [] : no respiratory distress [Exaggerated Use Of Accessory Muscles For Inspiration] : no accessory muscle use [Auscultation Breath Sounds / Voice Sounds] : lungs were clear to auscultation bilaterally [Heart Rate And Rhythm] : heart rate was normal and rhythm regular [Heart Sounds] : normal S1 and S2 [Murmurs] : no murmurs [Edema] : there was no peripheral edema [Bowel Sounds] : normal bowel sounds [Abdomen Soft] : soft [Abdomen Tenderness] : non-tender [Abdomen Mass (___ Cm)] : no abdominal mass palpated [No Spinal Tenderness] : no spinal tenderness [Involuntary Movements] : no involuntary movements were seen [Skin Color & Pigmentation] : normal skin color and pigmentation [No Focal Deficits] : no focal deficits [Oriented To Time, Place, And Person] : oriented to person, place, and time [Affect] : the affect was normal [Mood] : the mood was normal

## 2021-08-18 NOTE — ASSESSMENT
[FreeTextEntry1] : Impression:\par 1. Alternating bowel habits - mostly diarrhea with intermittent pain - most likely irritable bowel with diarrhea predominance; less likely pancreatic insufficiency, infectious etiology\par \par Plan:\par -Given symptoms without relief and getting worse with anti-diarrhea agents, will give course of Xifaxan for 14 days\par -Asked patient to continue to avoid dairy products\par -Check stool tests to repeat infectious workup, stool fat\par -FODMAP handout given to patient, educated her on keeping an eye on certain high FODMAP foods that can worsen symptoms\par -Trial of Citrucel fiber supplementation twice daily\par -Up to date with colon cancer screening, return for repeat colonoscopy in 2026

## 2021-08-18 NOTE — CONSULT LETTER
[Dear  ___] : Dear  [unfilled], [Courtesy Letter:] : I had the pleasure of seeing your patient, [unfilled], in my office today. [Please see my note below.] : Please see my note below. [Consult Closing:] : Thank you very much for allowing me to participate in the care of this patient.  If you have any questions, please do not hesitate to contact me. [Sincerely,] : Sincerely, [FreeTextEntry2] : Dr. Thai Mills [FreeTextEntry3] : Dejon Sheppard MD\par [facility]\par  of Medicine, [facility] [employer] [facility]\par Tel: [phone]\par Fax: [phone]\par

## 2021-09-09 ENCOUNTER — APPOINTMENT (OUTPATIENT)
Dept: INTERNAL MEDICINE | Facility: CLINIC | Age: 66
End: 2021-09-09
Payer: COMMERCIAL

## 2021-09-09 VITALS
WEIGHT: 142 LBS | DIASTOLIC BLOOD PRESSURE: 80 MMHG | HEART RATE: 70 BPM | OXYGEN SATURATION: 98 % | HEIGHT: 62 IN | TEMPERATURE: 97.8 F | BODY MASS INDEX: 26.13 KG/M2 | SYSTOLIC BLOOD PRESSURE: 130 MMHG

## 2021-09-09 DIAGNOSIS — R92.2 INCONCLUSIVE MAMMOGRAM: ICD-10-CM

## 2021-09-09 PROCEDURE — 99396 PREV VISIT EST AGE 40-64: CPT | Mod: 25

## 2021-09-09 PROCEDURE — G0442 ANNUAL ALCOHOL SCREEN 15 MIN: CPT

## 2021-09-09 PROCEDURE — G0008: CPT

## 2021-09-09 PROCEDURE — 90686 IIV4 VACC NO PRSV 0.5 ML IM: CPT

## 2021-09-09 NOTE — HEALTH RISK ASSESSMENT
[Good] : ~his/her~  mood as  good [Yes] : Yes [4 or more  times a week (4 pts)] : 4 or more  times a week (4 points) [1 or 2 (0 pts)] : 1 or 2 (0 points) [Never (0 pts)] : Never (0 points) [No falls in past year] : Patient reported no falls in the past year [0] : 2) Feeling down, depressed, or hopeless: Not at all (0) [PHQ-2 Negative - No further assessment needed] : PHQ-2 Negative - No further assessment needed [Retired] : retired [Single] : single [Fully functional (bathing, dressing, toileting, transferring, walking, feeding)] : Fully functional (bathing, dressing, toileting, transferring, walking, feeding) [Fully functional (using the telephone, shopping, preparing meals, housekeeping, doing laundry, using] : Fully functional and needs no help or supervision to perform IADLs (using the telephone, shopping, preparing meals, housekeeping, doing laundry, using transportation, managing medications and managing finances) [With Patient/Caregiver] : , with patient/caregiver [Designated Healthcare Proxy] : Designated healthcare proxy [Name: ___] : Health Care Proxy's Name: [unfilled]  [Relationship: ___] : Relationship: [unfilled] [] : No [Audit-CScore] : 4 [de-identified] : once a week marys  and walking  [[id number]] : 0 [Reports changes in hearing] : Reports no changes in hearing [Reports changes in vision] : Reports no changes in vision [Reports changes in dental health] : Reports no changes in dental health [de-identified] : Daughter  [FreeTextEntry2] : [employer]  [AdvancecareDate] : 9/9/21

## 2021-09-09 NOTE — HISTORY OF PRESENT ILLNESS
[de-identified] : \par This is a 67 yo F pmhx HLD, osteoporosis, migraine HAs, stress here for annual check up \par Last CPE 9/8/2020 Dr Clifton \par \par Denies fever/chills, cough, CP, sob, palpitations, dizziness, PND, orthopnea or leg swelling\par \par Hx anxiety - since her husbands death she saw her recently yesterday - pt has been having lot of stomach issues - she told her it could be autoimmune disease - recomedned to check few things\par \par hx diarrhea/constipation- on/off, chronic- stable sx's\par - saw Gastro DR Hung 8/18 /2021 placed her on Xifaxan 550 3 x a day for 2 week - feels a lot better - no pain or upset stomach / diarrhea \par -on Imodium prn\par -hx c-scope/EGD 11/16, told no intervention needed by GI\par -reports nl appetite; denies BRBPR, melena, n/v or abd pain. \par -gets diarrhea - since morning she went 4 times - could be ok 10 days then 2 days cannot leave home due to diarrhea - has less control on it , starts with feeling of need to go has to pull over and go - if not she will soil , not associated with abdoment cramp / pain- saw gastro since she was in her 20's \par -does colonoscopes -due in later this ear also did EGd all nl 2016 \par -she has colitis / gastritis/ gastroenteritis / does not wake her up from sleep - only if its her bad day she will wake up at 6am to go have BM \par \par hx TMJ- right jaw muscle spasms, getting better\par -following with dental specialist in TMJ- had f/u 6/20\par -doing PT on/off, is helping. Also does on own at home\par -using dental guard\par -using ice with help\par -hx muscle relaxer use prn\par -using Tylenol sparingly\par  \par Notes mild stress 2/2 pandemic, tolerable.\par \par Denies depression or anxiety. \par \par Denies  complaints. \par

## 2021-09-09 NOTE — ASSESSMENT
[FreeTextEntry1] : hx diarrhea/constipation- on/off, chronic- better after rx with Xifaxan \par -- saw Gastro DR Hung 8/18 /2021 placed her on Xifaxan 550 3 x a day for 2 week - feels a lot better - no pain or upset stomach / diarrhea \par -start Probiotics Organic\par -Get DONNA, VIT b12 , Heavy metals- Lead, mercury , TSH \par -followed by GI\par -on Imodium prn\par -hx c-scope/EGD 11/16\par -advised to f/u if GI sx's arise\par \par HLD, [id number] 4/2021 BW reviewed controlled Nl LFT \par -hx lipitor intolerance 2/2 nausea and dizziness \par -hx wt gain and water retention attributes to simvastatin (taking since 8/19), recently decreasing frequency of use with improved wt and retention reported. Asx otherwise. Advised to d/c simvastatin at 6/9/20 visit\par -on Crestor since 6/20, tolerating w/o SEs\par -advised low fat diet, exercise encouraged\par \par hx TMJ- right jaw muscle spasms, getting better\par -following with dental specialist in TMJ- had f/u 6/20\par -doing PT as able \par -using dental guard\par -using ice prn\par -hx muscle relaxer use, to cont prn \par -Tylenol prn\par \par migraines- controlled, rare onset\par -on imitrex prn\par \par osteoporosis- \par -hx DEXA 7/19\par -followed by GYN, seen 12/18 advised f/u q 2 yrs. Referral for new given per request.\par -hx actonel x 5 yrs, off 8/18 for holiday-get Dexa scan \par -ca/D\par -exercising encouraged\par \par hx stress- mom with dementia in NH, stable per pt. Mild stress 2/2 covid pandemic. Denies depression or anxiety.\par -declines  referral\par -info for behavioral health crisis walk in center given prior\par -reports good social support\par -advised to f/u as needed\par \par hx chronic LBP- asx\par -hx UC eval 1/20 with flexeril \par \par HCM\par -Flu vaccine- 9/9/21 \par -hx negative hep C screening 5/14\par -hx negative mammo 7/19, pt declines repeat this year- wishes to do q 2 yrs (due 2021)- referral given \par Dexa scan - osteopenia 2019 - referral given \par -hx negative PAP 12/18 by GYN per pt, requests referral for new- given\par -yearly derm screening advised. 4/2021 Regular use of sun block for skin cancer prevention advised.\par -yearly eye and dental screening advised\par -reports HCP: sister, Clotilde Bocanegra, cell: [phone]. Asked to provide copy of paperwork for records.\par -completed Moderna vaccine 3/17/21, 4/14/21\par -Tdap 5/28/21 \par Pt's cell: [phone]. \par \par

## 2021-09-17 LAB
ALBUMIN SERPL ELPH-MCNC: 5 G/DL
ALP BLD-CCNC: 69 U/L
ALT SERPL-CCNC: 42 U/L
ANION GAP SERPL CALC-SCNC: 17 MMOL/L
APPEARANCE: CLEAR
AST SERPL-CCNC: 34 U/L
BASOPHILS # BLD AUTO: 0.04 K/UL
BASOPHILS NFR BLD AUTO: 0.8 %
BILIRUB SERPL-MCNC: 0.6 MG/DL
BILIRUBIN URINE: NEGATIVE
BLOOD URINE: NEGATIVE
BUN SERPL-MCNC: 16 MG/DL
CALCIUM SERPL-MCNC: 10.1 MG/DL
CHLORIDE SERPL-SCNC: 102 MMOL/L
CHOLEST SERPL-MCNC: 196 MG/DL
CO2 SERPL-SCNC: 20 MMOL/L
COLOR: NORMAL
CREAT SERPL-MCNC: 0.96 MG/DL
EOSINOPHIL # BLD AUTO: 0.16 K/UL
EOSINOPHIL NFR BLD AUTO: 3.1 %
GLUCOSE QUALITATIVE U: NEGATIVE
GLUCOSE SERPL-MCNC: 93 MG/DL
HCT VFR BLD CALC: 41.6 %
HDLC SERPL-MCNC: 82 MG/DL
HGB BLD-MCNC: 13.9 G/DL
IMM GRANULOCYTES NFR BLD AUTO: 0.2 %
KETONES URINE: NEGATIVE
LDLC SERPL CALC-MCNC: 89 MG/DL
LEUKOCYTE ESTERASE URINE: NEGATIVE
LYMPHOCYTES # BLD AUTO: 1.54 K/UL
LYMPHOCYTES NFR BLD AUTO: 29.4 %
MAN DIFF?: NORMAL
MCHC RBC-ENTMCNC: 30.7 PG
MCHC RBC-ENTMCNC: 33.4 GM/DL
MCV RBC AUTO: 91.8 FL
MONOCYTES # BLD AUTO: 0.34 K/UL
MONOCYTES NFR BLD AUTO: 6.5 %
NEUTROPHILS # BLD AUTO: 3.14 K/UL
NEUTROPHILS NFR BLD AUTO: 60 %
NITRITE URINE: NEGATIVE
NONHDLC SERPL-MCNC: 115 MG/DL
PH URINE: 6.5
PLATELET # BLD AUTO: 213 K/UL
POTASSIUM SERPL-SCNC: 4.1 MMOL/L
PROT SERPL-MCNC: 7.6 G/DL
PROTEIN URINE: NEGATIVE
RBC # BLD: 4.53 M/UL
RBC # FLD: 12.7 %
SODIUM SERPL-SCNC: 139 MMOL/L
SPECIFIC GRAVITY URINE: 1.01
TRIGL SERPL-MCNC: 130 MG/DL
TSH SERPL-ACNC: 2.46 UIU/ML
UROBILINOGEN URINE: NORMAL
WBC # FLD AUTO: 5.23 K/UL

## 2021-11-17 LAB — GI PCR PANEL, STOOL: NORMAL

## 2021-11-22 ENCOUNTER — NON-APPOINTMENT (OUTPATIENT)
Age: 66
End: 2021-11-22

## 2021-11-23 LAB
A1AT STL-MCNC: 0.06 MG/G
C DIFF TOXIN B QL PCR REFLEX: NORMAL
GDH ANTIGEN: NOT DETECTED
PANCREATIC ELASTASE, FECAL: >500
TOXIN A AND B: NOT DETECTED

## 2021-11-29 LAB
FAT STL QN: NORMAL
FAT STL QN: NORMAL

## 2021-12-06 LAB — CALPROTECTIN FECAL: <16 UG/G

## 2021-12-08 ENCOUNTER — NON-APPOINTMENT (OUTPATIENT)
Age: 66
End: 2021-12-08

## 2022-01-31 ENCOUNTER — APPOINTMENT (OUTPATIENT)
Dept: GASTROENTEROLOGY | Facility: CLINIC | Age: 67
End: 2022-01-31
Payer: COMMERCIAL

## 2022-01-31 VITALS
TEMPERATURE: 97 F | HEART RATE: 88 BPM | SYSTOLIC BLOOD PRESSURE: 122 MMHG | DIASTOLIC BLOOD PRESSURE: 82 MMHG | WEIGHT: 140 LBS | BODY MASS INDEX: 24.8 KG/M2 | HEIGHT: 63 IN | OXYGEN SATURATION: 98 %

## 2022-01-31 DIAGNOSIS — Z87.898 PERSONAL HISTORY OF OTHER SPECIFIED CONDITIONS: ICD-10-CM

## 2022-01-31 PROCEDURE — 99214 OFFICE O/P EST MOD 30 MIN: CPT

## 2022-01-31 NOTE — HISTORY OF PRESENT ILLNESS
[Heartburn] : denies heartburn [Nausea] : denies nausea [Vomiting] : denies vomiting [Yellow Skin Or Eyes (Jaundice)] : denies jaundice [Abdominal Pain] : stable abdominal pain [Abdominal Swelling] : denies abdominal swelling [Rectal Pain] : denies rectal pain [_________] : Performed [unfilled] [Diarrhea] : improved diarrhea [Constipation] : improvement in constipation [Wt Loss ___ Lbs] : no recent weight loss [de-identified] : This is a 65 year old female with HLD, migraines, who presents for evaluation of alternating bowel habits, IBS.\par \par The patient was seen by Dr. Templeton in 2016 for diarrhea. She had negative stool testing for O&P, culture, giardia, and lactoferrin, and normal ESR/CRP then. She had an EGD/colonoscopy showing  no microscopic colitis, no signs of celiac disease. She reports having alternating diarrhea/constipation for a long time now, for many years. \par \par Since the last visit, she took Xifaxan for 14 days which improved her diarrhea, but it quickly came back afterwards. She was then started on amitriptyline in December 2021, and is taking 10mg qhs. She initially was sleeping more because of it, but it has gotten better. She reports a 90% improvement in her bowel habits, and the previous abdominal pain/discomfort is now resolved. She reports she's not quite there yet though - she showed a diary of he bowel habits, and she is having days, usually 3-4 days where she has no BM and will have intermittent diarrhea every ~1-1.5 weeks. She will take Imodium on those days with the diarrhea, which helped. Otherwise the stools are formed. She has no other GI complaint otherwise, no nausea/vomiting/trouble eating. No other new medications. She is taking Citrucel tablets, but only 1 tablet once a day.\par \par 11/16/21: GI PCR negative\par C diff negative\par Alpha 1 antitrypsin stool normal\par Elastase normal\par Stool fat normal\par Calprotectin normal\par  [de-identified] : Findings:\par      The examined esophagus was normal.\par      The Z-line was regular and was found 38 cm from the incisors.\par      Localized mild inflammation characterized by congestion (edema) and\par      erythema was found in the gastric antrum. Biopsies were taken of the\par      gastric antrum and body with a cold forceps for Helicobacter pylori\par      testing.\par      Diffuse moderate inflammation characterized by erythema and aphthous\par      ulcerations was found in the duodenal bulb and in the second part of the\par      duodenum. Biopsies for histology were taken of the 2nd portion of the\par      duodenum and bulb with a cold forceps for evaluation of celiac disease.\par                                                                                \par Impression:          - Gastritis. Biopsied.\par                      - Duodenitis. Biopsied.\par \par Path:\par 5. Duodenum, biopsy\par - Duodenal mucosa with features of peptic duodenitis.\par - Negative for gluten-sensitive enteropathy.\par - Negative for microorganisms.\par \par 6. Stomach, biopsy\par - Gastric antral mucosa with intestinal metaplasia and\par chronic inactive gastritis.\par - Gastric oxyntic mucosa with chronic inactive gastritis.\par - Negative for Helicobacter microorganisms (Warthin-Starry\par stain).\par - Negative for dysplasia. [de-identified] : The quality of the\par                      bowel preparation was adequate.\par                                                                                \par Findings:\par      The perianal exam findings include non-thrombosed external hemorrhoids.\par      The digital rectal exam was normal. Pertinent negatives include normal\par      sphincter tone and no palpable rectal lesions.\par      Non-bleeding internal hemorrhoids were found during retroflexion. The\par      hemorrhoids were small.\par      A moderate amount of liquid stool was found in the entire colon,\par      precluding visualization. Lavage of the area was performed using copious\par      amounts of sterile water, resulting in clearance with adequate\par      visualization.\par      There is no endoscopic evidence of erythema, inflammation, mass or\par      polyps in the entire colon. Random biopsies were taken of the ascending\par      colon, transverse colon, descending colon, and sigmoid colon to evaluate\par      for microscopic colitis.\par      No other significant abnormalities were identified in a careful\par      examination of the remainder of the colon.\par                                                                                \par Impression:          - Non-thrombosed external hemorrhoids found on perianal\par                      exam.\par                      - Non-bleeding internal hemorrhoids.\par                      - Normal colonoscopy. Random biopsies were taken of the\par                      ascending colon, transverse colon, descending colon, and\par                      sigmoid colon to evaluate for microscopic colitis.\par \par Path:\par 1. Colon, ascending, biopsy\par - Colonic mucosa with no significant diagnostic alterations.\par \par 2. Colon, transverse, biopsy\par - Colonic mucosa with no significant diagnostic alterations.\par \par 3. Colon, descending, biopsy\par - Colonic mucosa with no significant diagnostic alterations.\par \par 4. Colon, sigmoid, biopsy\par - Colonic mucosa with no significant diagnostic alterations.

## 2022-01-31 NOTE — ASSESSMENT
[FreeTextEntry1] : Impression:\par 1. Alternating bowel habits - mostly diarrhea with intermittent pain - most likely irritable bowel with diarrhea predominance - improving on TCA\par \par Plan:\par -Continue amitriptyline 10mg qhs; would not increase as her symptoms are most controlled and has some days of constipation now\par -Asked her to increase her fiber supplementation; in addition to fiber rich foods, increase the Citrucel tablets to at least 2 tabs BID, and TID if still with multiple days of constipation\par -Discussed hydration/adequate fluid intake\par -She asked about antibiotics for a dental implant next week; discussed that she should finish the course of antibiotics if needed and treat symptoms PRN with anti-diarrheal agents if she has recurrence of diarrhea during the time of antibiotics\par -RTC in 2026 for next screening colonoscopy\par \par RTC in 1-2 months if symptoms are not improved

## 2022-03-01 ENCOUNTER — RX RENEWAL (OUTPATIENT)
Age: 67
End: 2022-03-01

## 2022-03-09 ENCOUNTER — APPOINTMENT (OUTPATIENT)
Dept: INTERNAL MEDICINE | Facility: CLINIC | Age: 67
End: 2022-03-09
Payer: COMMERCIAL

## 2022-03-09 VITALS
TEMPERATURE: 98.5 F | SYSTOLIC BLOOD PRESSURE: 134 MMHG | BODY MASS INDEX: 25.16 KG/M2 | OXYGEN SATURATION: 98 % | HEIGHT: 63 IN | WEIGHT: 142 LBS | DIASTOLIC BLOOD PRESSURE: 76 MMHG | HEART RATE: 71 BPM

## 2022-03-09 DIAGNOSIS — M26.609 UNSPECIFIED TEMPOROMANDIBULAR JOINT DISORDER: ICD-10-CM

## 2022-03-09 PROCEDURE — 99214 OFFICE O/P EST MOD 30 MIN: CPT

## 2022-03-09 NOTE — HISTORY OF PRESENT ILLNESS
[de-identified] : This is a 65 yo F pmhx HLD, osteoporosis, migraine HAs, stress here for f/u on ch medical issues \par Last CPE 9/2021 \par \par had dental sx - will be getting implant - took amoxicillin - advised to start imodium when starting antibiotics \par -helped \par \par Denies fever/chills, cough, CP, sob, palpitations, dizziness, PND, orthopnea or leg swelling\par \par hx diarrhea/constipation- on/off, chronic- stable sx's- no episode for 21 days \par - advised to increase Citrucel bid daily -has BM daily normal \par -no abd pain or cramping - on amitriptyline 10 at bedtime helps \par - saw Gastro DR Hung 8/18 /2021 placed her on Xifaxan 550 3 x a day for 2 week - feels a lot better - no pain or upset stomach / diarrhea \par -on Imodium prn\par -hx c-scope/EGD 11/16, told no intervention needed by GI\par -reports nl appetite; denies BRBPR, melena, n/v or abd pain. \par -gets diarrhea - since morning she went 4 times - could be ok 10 days then 2 days cannot leave home due to diarrhea - has less control on it , starts with feeling of need to go has to pull over and go - if not she will soil , not associated with abdoment cramp / pain- saw gastro since she was in her 20's \par -does colonoscopes -due in later this ear also did EGd all nl 2016 \par -she has colitis / gastritis/ gastroenteritis / does not wake her up from sleep - only if its her bad day she will wake up at 6am to go have BM \par \par Hx anxiety - since her husbands death she saw her recently yesterday - pt has been having lot of stomach issues - she told her it could be autoimmune disease - recommended to check few things\par \par hx TMJ- right jaw muscle spasms, getting better\par -following with dental specialist in [place]- had f/u 6/20\par -doing PT on/off, is helping. Also does on own at home\par -using dental guard\par -using ice with help\par -hx muscle relaxer use prn\par -using Tylenol sparingly\par \par Denies depression or anxiety. \par \par Denies  complaints. \par

## 2022-03-09 NOTE — ASSESSMENT
[FreeTextEntry1] : \par hx diarrhea/constipation- on/off, chronic- better after rx with Xifaxan \par -stable sx's- no episode for 21 days \par - advised to increase Citrucel bid daily -has BM daily normal \par -no abd pain or cramping - on amitriptyline 10 at bedtime helps \par -- saw Gastro DR Hung 8/18 /2021 placed her on Xifaxan 550 3 x a day for 2 week - feels a lot better - no pain or upset stomach / diarrhea \par -followed by GI\par -on Imodium prn\par -hx c-scope/EGD 11/16\par -advised to f/u if GI sx's arise\par \par HLD, [id number] 4/2021 BW reviewed controlled Nl LFT \par -hx lipitor intolerance 2/2 nausea and dizziness \par -hx wt gain and water retention attributes to simvastatin (taking since 8/19), recently decreasing frequency of use with improved wt and retention reported. Asx otherwise. Advised to d/c simvastatin at 6/9/20 visit\par -on Crestor since 6/20, tolerating w/o SEs\par -advised low fat diet, exercise encouraged\par \par hx TMJ- right jaw muscle spasms, getting better\par -following with dental specialist in [place]- had f/u 6/20\par -doing PT as able \par -using dental guard\par -using ice prn\par -hx muscle relaxer use, to cont prn \par -Tylenol prn\par \par migraines- controlled, rare onset\par -on imitrex prn\par \par osteoporosis- \par -hx DEXA 7/19\par -followed by GYN, seen 12/18 advised f/u q 2 yrs. Referral for new given per request.\par -hx actonel x 5 yrs, off 8/18 for holiday-get Dexa scan \par -ca/D\par -exercising encouraged\par \par hx stress- mom with dementia in NH, stable per pt. Mild stress 2/2 covid pandemic. Denies depression or anxiety.\par -declines  referral\par -info for behavioral health crisis walk in center given prior\par -reports good social support\par -advised to f/u as needed\par \par hx chronic LBP- asx\par -hx UC eval 1/20 with flexeril \par \par HCM\par -Flu vaccine- 9/9/21 \par -hx negative hep C screening 5/14\par -hx negative mammo 7/19, pt declines repeat this year- wishes to do q 2 yrs (due 2021)- referral given \par Dexa scan - osteopenia 2019 - referral given \par -hx negative PAP 12/18 by GYN per pt, requests referral for new- given\par -yearly derm screening advised. 4/2021 Regular use of sun block for skin cancer prevention advised.\par -yearly eye and dental screening advised\par -reports HCP: sister, Clotilde Bocanegra, cell: [phone]. Asked to provide copy of paperwork for records.\par -completed Moderna vaccine 3/17/21, 4/14/21, booster 11/29/2021 \par -Tdap 5/28/21 \par Pt's cell: [phone]. \par \par RTC CPE 9/22

## 2022-07-20 ENCOUNTER — RX RENEWAL (OUTPATIENT)
Age: 67
End: 2022-07-20

## 2022-09-12 ENCOUNTER — APPOINTMENT (OUTPATIENT)
Dept: INTERNAL MEDICINE | Facility: CLINIC | Age: 67
End: 2022-09-12

## 2022-09-12 VITALS
DIASTOLIC BLOOD PRESSURE: 79 MMHG | HEART RATE: 82 BPM | TEMPERATURE: 98.5 F | OXYGEN SATURATION: 99 % | HEIGHT: 63 IN | WEIGHT: 144 LBS | BODY MASS INDEX: 25.52 KG/M2 | SYSTOLIC BLOOD PRESSURE: 126 MMHG

## 2022-09-12 PROCEDURE — G0008: CPT

## 2022-09-12 PROCEDURE — G0404: CPT

## 2022-09-12 PROCEDURE — 36415 COLL VENOUS BLD VENIPUNCTURE: CPT

## 2022-09-12 PROCEDURE — 90662 IIV NO PRSV INCREASED AG IM: CPT

## 2022-09-12 PROCEDURE — G0402 INITIAL PREVENTIVE EXAM: CPT

## 2022-09-12 NOTE — ASSESSMENT
[FreeTextEntry1] : EKG NSR @ 72 bpm [address] st changes \par \par hx diarrhea/constipation- on/off, chronic- better after rx with Xifaxan - saw [facility]STR[facility] 1/2022 taking Amitriptaline 10 mg qhs \par -stable sx's- no episode for 21 days \par - advised to increase Citrucel bid daily -has BM daily normal \par -no abd pain or cramping - on amitriptyline 10 at bedtime helps \par -- saw Gastro DR Hung 8/18 /2021 placed her on Xifaxan 550 3 x a day for 2 week - feels a lot better - no pain or upset stomach / diarrhea \par -followed by GI\par -on Imodium prn\par -hx c-scope/EGD 11/16-due 10 yrs \par -advised to f/u if GI sx's arise\par \par HLD, [id number]\par -hx lipitor intolerance 2/2 nausea and dizziness \par -hx wt gain and water retention attributes to simvastatin (taking since 8/19), recently decreasing frequency of use with improved wt and retention reported. Asx otherwise. Advised to d/c simvastatin at 6/9/20 visit\par -on Crestor since 6/20, tolerating w/o SEs\par -advised low fat diet, exercise encouraged\par \par hx TMJ- right jaw muscle spasms, getting better\par -following with dental specialist in [place]- had f/u 6/20\par -doing PT as able \par -using dental guard\par -using ice prn\par -hx muscle relaxer use, to cont prn \par -Tylenol prn\par \par migraines- controlled, rare onset\par -on imitrex prn\par \par osteoporosis- \par -hx DEXA 7/19\par -followed by GYN, seen 12/18 advised f/u q 2 yrs. Referral for new given per request.\par -hx actonel x 5 yrs, off 8/18 for holiday-get Dexa scan \par -ca/D\par -exercising encouraged\par \par hx stress- mom with dementia in NH, stable per pt. Mild stress 2/2 covid pandemic. Denies depression or anxiety.\par -declines  referral\par -info for behavioral health crisis walk in center given prior\par -reports good social support\par -advised to f/u as needed\par \par hx chronic LBP- asx\par -hx UC eval 1/20 with flexeril \par \par HCM\par colonoscope 2016 due 10 yrs \par Flu vaccine- 912/22 \par -Shingrex 7/1/22 - due second 10/22 \par hx negative hep C screening 5/2014\par -hx negative mammo 7/19, pt declines repeat this year- wishes to do q 2 yrs (due 2021)- referral given \par Dexa scan - osteopenia 2019 - referral given \par -hx negative PAP 12/18 by GYN per pt, requests referral for new- given\par -yearly derm screening advised. 4/2021 Regular use of sun block for skin cancer prevention advised.\par -yearly eye and dental screening advised\par -reports HCP: sister, Clotilde Bocanegra, cell: [phone]. Asked to provide copy of paperwork for records.\par -completed Moderna vaccine 3/17/21, 4/14/21, booster 11/29/2021 , \par Tdap 5/28/21 \par Pt's cell: [phone]. \par \par RTC 6 months \par

## 2022-09-12 NOTE — HISTORY OF PRESENT ILLNESS
[de-identified] : This is a 68 yo F pmhx HLD, osteoporosis, migraine HAs, stress seen today for Initial wellness visit \par \par left ankle fracture after stepping on pot Hole - 8/18/22 - leg in Boot - no weight bearing - 3 weeks now - followed by Podiatrist - \par \par had dental sx - will be getting implant - took amoxicillin - advised to start imodium when starting antibiotics \par -helped \par \par Denies fever/chills, cough, CP, sob, palpitations, dizziness, PND, orthopnea or leg swelling\par \par hx diarrhea/constipation- on/off, chronic- stable sx's- no episode for 21 days \par - advised to increase Citrucel bid daily -has BM daily normal \par -no abd pain or cramping - on amitriptyline 10 at bedtime helps \par - saw Gastro DR Hung 8/18 /2021 placed her on Xifaxan 550 3 x a day for 2 week - feels a lot better - no pain or upset stomach / diarrhea \par -on Imodium prn\par -hx c-scope/EGD 11/16, told no intervention needed by GI- due 10 yrs - saw him 8/2021 \par -reports nl appetite; denies BRBPR, melena, n/v or abd pain. \par -gets diarrhea - since morning she went 4 times - could be ok 10 days then 2 days cannot leave home due to diarrhea - has less control on it , starts with feeling of need to go has to pull over and go - if not she will soil , not associated with abdoment cramp / pain- saw gastro since she was in her 20's \par -does colonoscopes -due in later this ear also did EGd all nl 2016 \par -she has colitis / gastritis/ gastroenteritis / does not wake her up from sleep - only if its her bad day she will wake up at 6am to go have BM \par \par Hx anxiety - since her husbands death she saw her recently yesterday - pt has been having lot of stomach issues - she told her it could be autoimmune disease - recommended to check few things\par \par hx TMJ- right jaw muscle spasms, getting better\par -following with dental specialist in [place]- had f/u 6/20\par -doing PT on/off, is helping. Also does on own at home\par -using dental guard\par -using ice with help\par -hx muscle relaxer use prn\par -using Tylenol sparingly\par \par Denies depression or anxiety. \par \par Denies  complaints. \par  \par

## 2022-09-12 NOTE — HEALTH RISK ASSESSMENT
[Good] : ~his/her~  mood as  good [Never] : Never [Yes] : Yes [4 or more  times a week (4 pts)] : 4 or more  times a week (4 points) [1 or 2 (0 pts)] : 1 or 2 (0 points) [Never (0 pts)] : Never (0 points) [No] : In the past 12 months have you used drugs other than those required for medical reasons? No [No falls in past year] : Patient reported no falls in the past year [0] : 2) Feeling down, depressed, or hopeless: Not at all (0) [Patient reported PAP Smear was normal] : Patient reported PAP Smear was normal [Alone] : lives alone [Retired] : retired [Single] : single [Fully functional (bathing, dressing, toileting, transferring, walking, feeding)] : Fully functional (bathing, dressing, toileting, transferring, walking, feeding) [Fully functional (using the telephone, shopping, preparing meals, housekeeping, doing laundry, using] : Fully functional and needs no help or supervision to perform IADLs (using the telephone, shopping, preparing meals, housekeeping, doing laundry, using transportation, managing medications and managing finances) [Audit-CScore] : 4 [de-identified] : was doing training q weekly off since left  ankle fracture  [NYT3Tyacd] : 0 [Reports changes in hearing] : Reports no changes in hearing [Reports changes in vision] : Reports no changes in vision [Reports changes in dental health] : Reports no changes in dental health [PapSmearDate] : 2019

## 2022-09-13 LAB
25(OH)D3 SERPL-MCNC: 41.5 NG/ML
ALBUMIN SERPL ELPH-MCNC: 5.1 G/DL
ALP BLD-CCNC: 69 U/L
ALT SERPL-CCNC: 40 U/L
ANION GAP SERPL CALC-SCNC: 13 MMOL/L
AST SERPL-CCNC: 23 U/L
BASOPHILS # BLD AUTO: 0.06 K/UL
BASOPHILS NFR BLD AUTO: 0.9 %
BILIRUB SERPL-MCNC: 0.8 MG/DL
BUN SERPL-MCNC: 13 MG/DL
CALCIUM SERPL-MCNC: 10.3 MG/DL
CHLORIDE SERPL-SCNC: 102 MMOL/L
CHOLEST SERPL-MCNC: 242 MG/DL
CO2 SERPL-SCNC: 25 MMOL/L
CREAT SERPL-MCNC: 0.9 MG/DL
EGFR: 71 ML/MIN/1.73M2
EOSINOPHIL # BLD AUTO: 0.56 K/UL
EOSINOPHIL NFR BLD AUTO: 8.4 %
ESTIMATED AVERAGE GLUCOSE: 108 MG/DL
GLUCOSE SERPL-MCNC: 100 MG/DL
HBA1C MFR BLD HPLC: 5.4 %
HCT VFR BLD CALC: 42.7 %
HDLC SERPL-MCNC: 101 MG/DL
HGB BLD-MCNC: 14.2 G/DL
IMM GRANULOCYTES NFR BLD AUTO: 0.3 %
LDLC SERPL CALC-MCNC: 99 MG/DL
LYMPHOCYTES # BLD AUTO: 1.94 K/UL
LYMPHOCYTES NFR BLD AUTO: 29.1 %
MAN DIFF?: NORMAL
MCHC RBC-ENTMCNC: 30.9 PG
MCHC RBC-ENTMCNC: 33.3 GM/DL
MCV RBC AUTO: 93 FL
MONOCYTES # BLD AUTO: 0.38 K/UL
MONOCYTES NFR BLD AUTO: 5.7 %
NEUTROPHILS # BLD AUTO: 3.71 K/UL
NEUTROPHILS NFR BLD AUTO: 55.6 %
NONHDLC SERPL-MCNC: 141 MG/DL
PLATELET # BLD AUTO: 232 K/UL
POTASSIUM SERPL-SCNC: 3.9 MMOL/L
PROT SERPL-MCNC: 7.6 G/DL
RBC # BLD: 4.59 M/UL
RBC # FLD: 12.6 %
SODIUM SERPL-SCNC: 140 MMOL/L
TRIGL SERPL-MCNC: 209 MG/DL
TSH SERPL-ACNC: 3.08 UIU/ML
WBC # FLD AUTO: 6.67 K/UL

## 2023-01-03 ENCOUNTER — RX RENEWAL (OUTPATIENT)
Age: 68
End: 2023-01-03

## 2023-02-02 ENCOUNTER — RESULT REVIEW (OUTPATIENT)
Age: 68
End: 2023-02-02

## 2023-02-02 ENCOUNTER — APPOINTMENT (OUTPATIENT)
Dept: ULTRASOUND IMAGING | Facility: CLINIC | Age: 68
End: 2023-02-02
Payer: MEDICARE

## 2023-02-02 ENCOUNTER — APPOINTMENT (OUTPATIENT)
Dept: RADIOLOGY | Facility: CLINIC | Age: 68
End: 2023-02-02
Payer: MEDICARE

## 2023-02-02 ENCOUNTER — APPOINTMENT (OUTPATIENT)
Dept: MAMMOGRAPHY | Facility: CLINIC | Age: 68
End: 2023-02-02
Payer: MEDICARE

## 2023-02-02 PROCEDURE — 77063 BREAST TOMOSYNTHESIS BI: CPT

## 2023-02-02 PROCEDURE — 77080 DXA BONE DENSITY AXIAL: CPT

## 2023-02-02 PROCEDURE — 76641 ULTRASOUND BREAST COMPLETE: CPT | Mod: 50,GA

## 2023-02-02 PROCEDURE — 77067 SCR MAMMO BI INCL CAD: CPT

## 2023-03-13 ENCOUNTER — APPOINTMENT (OUTPATIENT)
Dept: INTERNAL MEDICINE | Facility: CLINIC | Age: 68
End: 2023-03-13
Payer: MEDICARE

## 2023-03-13 VITALS
DIASTOLIC BLOOD PRESSURE: 77 MMHG | OXYGEN SATURATION: 98 % | TEMPERATURE: 97.8 F | SYSTOLIC BLOOD PRESSURE: 125 MMHG | HEIGHT: 63 IN | BODY MASS INDEX: 25.87 KG/M2 | HEART RATE: 76 BPM | WEIGHT: 146 LBS

## 2023-03-13 DIAGNOSIS — Z23 ENCOUNTER FOR IMMUNIZATION: ICD-10-CM

## 2023-03-13 DIAGNOSIS — E66.3 OVERWEIGHT: ICD-10-CM

## 2023-03-13 PROCEDURE — G0009: CPT

## 2023-03-13 PROCEDURE — 90677 PCV20 VACCINE IM: CPT

## 2023-03-13 PROCEDURE — 99214 OFFICE O/P EST MOD 30 MIN: CPT | Mod: 25

## 2023-03-13 PROCEDURE — 36415 COLL VENOUS BLD VENIPUNCTURE: CPT

## 2023-03-13 NOTE — HISTORY OF PRESENT ILLNESS
[de-identified] : This is a 66 yo F pmhx HLD, osteoporosis, migraine HAs, stress seen today for f/u on ch medical issues \par \par hand OA- using Volertan gel on and off \par \par hx left ankle fracture after stepping on pot Hole - 8/18/22 - leg in Boot - no weight bearing - followed by Podiatrist - wering compression socks for swelling \par \par had dental sx - will be getting implant - took amoxicillin - advised to start imodium when starting antibiotics \par -helped \par \par Denies fever/chills, cough, CP, sob, palpitations, dizziness, PND, orthopnea or leg swelling\par \par hx diarrhea/constipation- on/off, chronic- stable sx's- no episode for 21 days \par - advised to increase Citrucel bid daily -has BM daily normal \par -no abd pain or cramping - on amitriptyline 10 at bedtime helps \par - saw Gastro DR Hung 8/18 /2021 placed her on Xifaxan 550 3 x a day for 2 week - feels a lot better - no pain or upset stomach / diarrhea \par -on Imodium prn\par -hx c-scope/EGD 11/16, told no intervention needed by GI- due 10 yrs - saw him 8/2021 \par -reports nl appetite; denies BRBPR, melena, n/v or abd pain. \par -gets diarrhea - since morning she went 4 times - could be ok 10 days then 2 days cannot leave home due to diarrhea - has less control on it , starts with feeling of need to go has to pull over and go - if not she will soil , not associated with abdoment cramp / pain- saw gastro since she was in her 20's \par -does colonoscopes -due in later this ear also did EGd all nl 2016 \par -she has colitis / gastritis/ gastroenteritis / does not wake her up from sleep - only if its her bad day she will wake up at 6am to go have BM \par \par Hx anxiety - since her husbands death she saw her recently yesterday - pt has been having lot of stomach issues - she told her it could be autoimmune disease - recommended to check few things\par \par hx TMJ- right jaw muscle spasms, getting better\par -following with dental specialist in [place]- had f/u 6/20\par -doing PT on/off, is helping. Also does on own at home\par -using dental guard\par -using ice with help\par -hx muscle relaxer use prn\par -using Tylenol sparingly\par

## 2023-03-13 NOTE — ASSESSMENT
[FreeTextEntry1] : \par \par hand OA- using Volertan gel on and off \par \par hx diarrhea/constipation- on/off, chronic- better after rx with Xifaxan - saw [facility]STR[facility] 1/2022 taking Amitriptaline 10 mg qhs \par -stable sx's- no episode for 21 days \par - advised to increase Citrucel bid daily -has BM daily normal \par -no abd pain or cramping - on amitriptyline 10 at bedtime helps \par -- saw Gastro DR Hung 8/18 /2021 placed her on Xifaxan 550 3 x a day for 2 week - feels a lot better - no pain or upset stomach / diarrhea \par -followed by GI\par -on Imodium prn\par -hx c-scope/EGD 11/16-due 10 yrs \par -advised to f/u if GI sx's arise\par \par HLD, [id number]\par -hx lipitor intolerance 2/2 nausea and dizziness \par -hx wt gain and water retention attributes to simvastatin (taking since 8/19), recently decreasing frequency of use with improved wt and retention reported. Asx otherwise. Advised to d/c simvastatin at 6/9/20 visit\par -on Crestor since 6/20, tolerating w/o SEs\par -advised low fat diet, exercise encouraged\par \par hx TMJ- right jaw muscle spasms, getting better\par -following with dental specialist in [place]- had f/u 6/20\par -doing PT as able \par -using dental guard\par -using ice prn\par -hx muscle relaxer use, to cont prn \par -Tylenol prn\par \par migraines- controlled, rare onset\par -on imitrex prn\par \par osteoporosis- \par -hx DEXA 9/2022 stable osteopenia \par -followed by GYN, seen 12/18 advised f/u q 2 yrs. Referral for new given per request.\par -hx actonel x 5 yrs, off 8/18 for holiday- Dexa scan 9/2022 stable \par -ca/D\par -exercising encouraged\par \par hx stress- mom with dementia in NH, stable per pt. Mild stress 2/2 covid pandemic. Denies depression or anxiety.\par -declines BH referral\par -info for behavioral health crisis walk in center given prior\par -reports good social support\par -advised to f/u as needed\par \par hx chronic LBP- asx\par -hx UC eval 1/20 with flexeril \par \par HCM\par GYN due - advised \par Prevnar 20 - 3/13/23 \par colonoscope 2016 due 10 yrs \par Flu vaccine- 912/22 \par -Shingrex 7/1/22 - 10/22 \par hx negative hep C screening 5/2014\par -hx negative mammo 7/19, pt declines repeat this year- wishes to do q 2 yrs (due 2021)- referral given \par Dexa scan - osteopenia 9/2022 \par -hx negative PAP 12/18 by GYN per pt, requests referral for new- given\par -yearly derm screening advised. 4/2021 Regular use of sun block for skin cancer prevention advised.\par -yearly eye and dental screening advised\par -reports HCP: sister, Clotilde Bocanegra, cell: [phone]. Asked to provide copy of paperwork for records.\par -completed Moderna vaccine 3/17/21, 4/14/21, booster 11/29/2021 , \par Tdap 5/28/21 \par Pt's cell: [phone]. \par \par RTC 6 months \par .

## 2023-03-14 LAB
ALBUMIN SERPL ELPH-MCNC: 4.8 G/DL
ALP BLD-CCNC: 67 U/L
ALT SERPL-CCNC: 45 U/L
ANION GAP SERPL CALC-SCNC: 14 MMOL/L
AST SERPL-CCNC: 35 U/L
BILIRUB SERPL-MCNC: 0.9 MG/DL
BUN SERPL-MCNC: 15 MG/DL
CALCIUM SERPL-MCNC: 10.7 MG/DL
CHLORIDE SERPL-SCNC: 100 MMOL/L
CHOLEST SERPL-MCNC: 225 MG/DL
CO2 SERPL-SCNC: 24 MMOL/L
CREAT SERPL-MCNC: 0.97 MG/DL
EGFR: 65 ML/MIN/1.73M2
GLUCOSE SERPL-MCNC: 89 MG/DL
HDLC SERPL-MCNC: 86 MG/DL
LDLC SERPL CALC-MCNC: 105 MG/DL
NONHDLC SERPL-MCNC: 139 MG/DL
POTASSIUM SERPL-SCNC: 4.3 MMOL/L
PROT SERPL-MCNC: 7.4 G/DL
SODIUM SERPL-SCNC: 138 MMOL/L
TRIGL SERPL-MCNC: 172 MG/DL

## 2023-06-22 ENCOUNTER — RX RENEWAL (OUTPATIENT)
Age: 68
End: 2023-06-22

## 2023-08-10 ENCOUNTER — APPOINTMENT (OUTPATIENT)
Dept: ORTHOPEDIC SURGERY | Facility: CLINIC | Age: 68
End: 2023-08-10
Payer: MEDICARE

## 2023-08-10 VITALS
BODY MASS INDEX: 25.52 KG/M2 | WEIGHT: 144 LBS | HEART RATE: 87 BPM | SYSTOLIC BLOOD PRESSURE: 130 MMHG | HEIGHT: 63 IN | DIASTOLIC BLOOD PRESSURE: 87 MMHG | TEMPERATURE: 97.1 F

## 2023-08-10 DIAGNOSIS — S93.492A SPRAIN OF OTHER LIGAMENT OF LEFT ANKLE, INITIAL ENCOUNTER: ICD-10-CM

## 2023-08-10 PROCEDURE — 99204 OFFICE O/P NEW MOD 45 MIN: CPT

## 2023-08-10 NOTE — HISTORY OF PRESENT ILLNESS
[de-identified] : Patient is here for left foot pain. she has had several injuries to the foot. She states that she feels like her calf is atrophied. There was no recent injury. She states that her foot gets blue when she is sitting for prolonged periods. She walks for exercise. She occasionally takes Aleve. Patient has been under the care of Dr. Pieter Begum for these injuries.  She states she never had surgery, never had injections and has not done any physical therapy for any of her injuries.  Patient states her most recent images were in August 2022, they were done at the Ortho office, she does not have any images for review today.  The patient's past medical history, past surgical history, medications and allergies were reviewed by me today and documented accordingly. In addition, the patient's family and social history, which were noncontributory to this visit, were reviewed also. Intake form was reviewed. The patient has no family history of arthritis.

## 2023-08-10 NOTE — PHYSICAL EXAM
[de-identified] : Constitutional: Well-nourished, well-developed, No acute distress Respiratory:  Good respiratory effort, no SOB Lymphatic: No regional lymphadenopathy, no lymphedema Psychiatric: Pleasant and normal affect, alert and oriented x3 Musculoskeletal: normal except where as noted in regional exam  Left ankle: APPEARANCE: No swelling, no marked deformities or malalignment POSITIVE TENDERNESS: ATFL, CFL, sinus tarsus, peroneal tendons posterior to the lateral malleolus, dorsal talonavicular joint NONTENDER: medial malleolus, lateral malleolus, tibialis posterior tendon, achilles tendon, no marked thickening of tendon, PTFL, anterior tibiofibular ligament (high ankle), deltoid ligaments, 5th metatarsal.  RANGE OF MOTION: full & painless.  RESISTIVE TESTING: Mildly painful 4/5 resisted eversion, painless resisted dorsiflexion, plantar flexion, inversion.  SPECIAL TESTS: neg anterior drawer. neg talar tilt. neg Miguel Ángel's

## 2023-08-10 NOTE — DISCUSSION/SUMMARY
[de-identified] : Discussed findings of today's exam and possible causes of patient's pain.  Educated patient on their most probable diagnosis of chronic intermittent left ankle pain for the last 4+ years with history of multiple injuries, likely resultant grade 1 or possibly grade 2 sprain of the ATFL and CFL but no evidence of any high-grade ligament rupture or instability of the ankle.  Patient is 67 years old and likely has some underlying osteoarthritis as well, but clinically it does not appear to be severe in nature.  Reviewed possible courses of treatment, and we collaboratively decided best course of treatment at this time will include conservative management.  Patient has been under the care of an outside orthopedic for her multitude of left ankle and foot injuries over the last 4+ years, she states she has never done physical therapy for her ankle.  Patient is educated that the usual first-line and primary means of management for nonsurgical ankle sprains and/or other injuries requires a course of formal physical therapy.  Patient states she exercises once a week with a , she may continue to do so as tolerated.  She would certainly benefit from a course of physical therapy to teach her a home exercise program that can be utilized to help stabilize her ankle and strength in her calf muscle.  Patient was concerned about some atrophy of the left calf muscle, there is no appreciable difference in size of the muscles bilaterally today.  Patient is also concerned that when she is seated for prolonged amount of time her left foot will turn blue.  She is advised that this is not an orthopedic problem in etiology, that would be a vascular issue.  Simple treatment to prevent collection of blood flow and stagnation of blood flow is to perform ankle pumps when seated, she is educated how to do that today.  If she has this persisting issue I recommend vascular surgery consultation for further evaluation and management.  Patient has no surgical indications based on history and clinical exam, no need for imaging at this time.  Patient started on a course of topical NSAIDs to be utilized as needed for pain relief, prescription given for diclofenac gel 1% to be applied to the area of pain 2-3 times daily as needed (We discussed all possible side effects of this medication).  Follow up as needed.  Patient appreciates and agrees with current plan.  I work as part of an academic orthopedic group and routinely have a physician in training (resident / fellow) working with me.  Any part of the history and physical exam performed by the physician in training was either directly reviewed and/or replicated by myself.  Any procedure performed by the physician in training was performed under my direct supervision and with the consent of the patient.  This note was generated using dragon medical dictation software.  A reasonable effort has been made for proofreading its contents, but typos may still remain.  If there are any questions or points of clarification needed please notify my office.

## 2023-09-14 ENCOUNTER — APPOINTMENT (OUTPATIENT)
Dept: INTERNAL MEDICINE | Facility: CLINIC | Age: 68
End: 2023-09-14
Payer: MEDICARE

## 2023-09-14 VITALS
SYSTOLIC BLOOD PRESSURE: 133 MMHG | RESPIRATION RATE: 15 BRPM | WEIGHT: 144 LBS | DIASTOLIC BLOOD PRESSURE: 78 MMHG | HEART RATE: 72 BPM | HEIGHT: 63 IN | BODY MASS INDEX: 25.52 KG/M2 | TEMPERATURE: 98 F | OXYGEN SATURATION: 98 %

## 2023-09-14 DIAGNOSIS — Z23 ENCOUNTER FOR IMMUNIZATION: ICD-10-CM

## 2023-09-14 DIAGNOSIS — Z00.00 ENCOUNTER FOR GENERAL ADULT MEDICAL EXAMINATION W/OUT ABNORMAL FINDINGS: ICD-10-CM

## 2023-09-14 PROCEDURE — 36415 COLL VENOUS BLD VENIPUNCTURE: CPT

## 2023-09-14 PROCEDURE — G0008: CPT

## 2023-09-14 PROCEDURE — 90662 IIV NO PRSV INCREASED AG IM: CPT

## 2023-09-14 PROCEDURE — G0438: CPT

## 2023-09-14 RX ORDER — RIFAXIMIN 550 MG/1
550 TABLET ORAL
Qty: 42 | Refills: 0 | Status: DISCONTINUED | COMMUNITY
Start: 2021-08-18 | End: 2023-09-14

## 2023-09-15 LAB
25(OH)D3 SERPL-MCNC: 47 NG/ML
ALBUMIN SERPL ELPH-MCNC: 5.2 G/DL
ALP BLD-CCNC: 75 U/L
ALT SERPL-CCNC: 55 U/L
ANION GAP SERPL CALC-SCNC: 17 MMOL/L
AST SERPL-CCNC: 35 U/L
BASOPHILS # BLD AUTO: 0.05 K/UL
BASOPHILS NFR BLD AUTO: 0.7 %
BILIRUB SERPL-MCNC: 1.1 MG/DL
BUN SERPL-MCNC: 11 MG/DL
CALCIUM SERPL-MCNC: 10.7 MG/DL
CHLORIDE SERPL-SCNC: 101 MMOL/L
CHOLEST SERPL-MCNC: 224 MG/DL
CO2 SERPL-SCNC: 22 MMOL/L
CREAT SERPL-MCNC: 0.97 MG/DL
EGFR: 64 ML/MIN/1.73M2
EOSINOPHIL # BLD AUTO: 0.39 K/UL
EOSINOPHIL NFR BLD AUTO: 5.2 %
ESTIMATED AVERAGE GLUCOSE: 108 MG/DL
GLUCOSE SERPL-MCNC: 94 MG/DL
HBA1C MFR BLD HPLC: 5.4 %
HCT VFR BLD CALC: 43.8 %
HDLC SERPL-MCNC: 97 MG/DL
HGB BLD-MCNC: 14.8 G/DL
IMM GRANULOCYTES NFR BLD AUTO: 0.4 %
LDLC SERPL CALC-MCNC: 100 MG/DL
LYMPHOCYTES # BLD AUTO: 1.9 K/UL
LYMPHOCYTES NFR BLD AUTO: 25.2 %
MAN DIFF?: NORMAL
MCHC RBC-ENTMCNC: 31.9 PG
MCHC RBC-ENTMCNC: 33.8 GM/DL
MCV RBC AUTO: 94.4 FL
MONOCYTES # BLD AUTO: 0.41 K/UL
MONOCYTES NFR BLD AUTO: 5.4 %
NEUTROPHILS # BLD AUTO: 4.77 K/UL
NEUTROPHILS NFR BLD AUTO: 63.1 %
NONHDLC SERPL-MCNC: 127 MG/DL
PLATELET # BLD AUTO: 253 K/UL
POTASSIUM SERPL-SCNC: 4.5 MMOL/L
PROT SERPL-MCNC: 7.8 G/DL
RBC # BLD: 4.64 M/UL
RBC # FLD: 12.8 %
SODIUM SERPL-SCNC: 141 MMOL/L
TRIGL SERPL-MCNC: 165 MG/DL
TSH SERPL-ACNC: 2.1 UIU/ML
VIT B12 SERPL-MCNC: 1858 PG/ML
WBC # FLD AUTO: 7.55 K/UL

## 2023-11-02 ENCOUNTER — APPOINTMENT (OUTPATIENT)
Dept: DERMATOLOGY | Facility: CLINIC | Age: 68
End: 2023-11-02
Payer: MEDICARE

## 2023-11-02 DIAGNOSIS — L81.4 OTHER MELANIN HYPERPIGMENTATION: ICD-10-CM

## 2023-11-02 DIAGNOSIS — D22.9 MELANOCYTIC NEVI, UNSPECIFIED: ICD-10-CM

## 2023-11-02 DIAGNOSIS — Z12.83 ENCOUNTER FOR SCREENING FOR MALIGNANT NEOPLASM OF SKIN: ICD-10-CM

## 2023-11-02 DIAGNOSIS — L57.0 ACTINIC KERATOSIS: ICD-10-CM

## 2023-11-02 DIAGNOSIS — L82.0 INFLAMED SEBORRHEIC KERATOSIS: ICD-10-CM

## 2023-11-02 PROCEDURE — 99213 OFFICE O/P EST LOW 20 MIN: CPT | Mod: 25

## 2023-11-02 PROCEDURE — 17110 DESTRUCTION B9 LES UP TO 14: CPT | Mod: 59

## 2023-11-02 PROCEDURE — 17000 DESTRUCT PREMALG LESION: CPT | Mod: 59

## 2023-12-11 ENCOUNTER — RX RENEWAL (OUTPATIENT)
Age: 68
End: 2023-12-11

## 2024-01-29 RX ORDER — AMITRIPTYLINE HYDROCHLORIDE 10 MG/1
10 TABLET, FILM COATED ORAL
Qty: 30 | Refills: 5 | Status: ACTIVE | COMMUNITY
Start: 2021-12-02 | End: 1900-01-01

## 2024-03-14 ENCOUNTER — APPOINTMENT (OUTPATIENT)
Dept: INTERNAL MEDICINE | Facility: CLINIC | Age: 69
End: 2024-03-14
Payer: MEDICARE

## 2024-03-14 VITALS
BODY MASS INDEX: 25.69 KG/M2 | DIASTOLIC BLOOD PRESSURE: 77 MMHG | OXYGEN SATURATION: 98 % | SYSTOLIC BLOOD PRESSURE: 123 MMHG | HEIGHT: 63 IN | HEART RATE: 71 BPM | WEIGHT: 145 LBS

## 2024-03-14 DIAGNOSIS — Z86.69 PERSONAL HISTORY OF OTHER DISEASES OF THE NERVOUS SYSTEM AND SENSE ORGANS: ICD-10-CM

## 2024-03-14 DIAGNOSIS — E78.5 HYPERLIPIDEMIA, UNSPECIFIED: ICD-10-CM

## 2024-03-14 DIAGNOSIS — M81.0 AGE-RELATED OSTEOPOROSIS W/OUT CURRENT PATHOLOGICAL FRACTURE: ICD-10-CM

## 2024-03-14 PROCEDURE — G2211 COMPLEX E/M VISIT ADD ON: CPT

## 2024-03-14 PROCEDURE — 36415 COLL VENOUS BLD VENIPUNCTURE: CPT

## 2024-03-14 PROCEDURE — 99214 OFFICE O/P EST MOD 30 MIN: CPT

## 2024-03-14 RX ORDER — ROSUVASTATIN CALCIUM 5 MG/1
5 TABLET, FILM COATED ORAL
Qty: 90 | Refills: 2 | Status: ACTIVE | COMMUNITY
Start: 2020-06-24 | End: 1900-01-01

## 2024-03-14 RX ORDER — CYCLOBENZAPRINE HYDROCHLORIDE 5 MG/1
5 TABLET, FILM COATED ORAL
Qty: 14 | Refills: 0 | Status: DISCONTINUED | COMMUNITY
Start: 2021-08-29 | End: 2024-03-14

## 2024-03-14 NOTE — HISTORY OF PRESENT ILLNESS
[de-identified] : This is a 67 yo F pmhx HLD, osteoporosis, migraine HAs, stress seen for f/u on ch issues   hand OA- using Volertan gel on and off   hx left ankle fracture after stepping on pot Hole - 8/18/22 - leg in Boot - no weight bearing - followed by Podiatrist - wering compression socks for swelling  -saw Ortho - started PT - helping - still hurts to walk in grocery stores   had dental sx - will be getting implant - took amoxicillin - advised to start imodium when starting antibiotics  -helped   Denies fever/chills, cough, CP, sob, palpitations, dizziness, PND, orthopnea or leg swelling  hx diarrhea/constipation- on/off, chronic- stable sx's- no episode for 21 days  - advised to increase Citrucel bid daily -has BM daily normal  -no abd pain or cramping - on amitriptyline 10 at bedtime helps  - saw Gastro DR Hung 8/18 /2021 placed her on Xifaxan 550 3 x a day for 2 week - feels a lot better - no pain or upset stomach / diarrhea  -on Imodium prn -hx c-scope/EGD 11/16, told no intervention needed by GI- due 10 yrs - saw him 8/2021  -reports nl appetite; denies BRBPR, melena, n/v or abd pain.  -gets diarrhea - since morning she went 4 times - could be ok 10 days then 2 days cannot leave home due to diarrhea - has less control on it , starts with feeling of need to go has to pull over and go - if not she will soil , not associated with abdoment cramp / pain- saw gastro since she was in her 20's  -does colonoscopes -due in later this ear also did EGd all nl 2016  -she has colitis / gastritis/ gastroenteritis / does not wake her up from sleep - only if its her bad day she will wake up at 6am to go have BM   Hx anxiety - since her husbands death - pt has been having lot of stomach issues - she told her it could be autoimmune disease - recommended to check few things  hx TMJ- right jaw muscle spasms, getting better -following with dental specialist in [place]- had f/u 6/20 -doing PT on/off, is helping. Also does on own at home -using dental guard -using ice with help -hx muscle relaxer use prn -using Tylenol sparingly

## 2024-03-14 NOTE — ASSESSMENT
[FreeTextEntry1] : hx left ankle fracture after stepping on pot Hole - 8/18/22 -better  -saw Ortho - started PT - helping - still hurts to walk in grocery stores   hand OA- using Volertan gel on and off   hx diarrhea/constipation- on/off, chronic- better after rx with Xifaxan - saw GASTRO 1/2022 taking Amitriptaline 10 mg qhs  -stable sx's- no episode for 21 days  - advised to increase Citrucel bid daily -has BM daily normal  -no abd pain or cramping - on amitriptyline 10 at bedtime helps  -- saw Gastro DR Hung 8/18 /2021 placed her on Xifaxan 550 3 x a day for 2 week - feels a lot better - no pain or upset stomach / diarrhea  -followed by GI -on Imodium prn -hx c-scope/EGD 11/16-due 10 yrs  -advised to f/u if GI sx's arise  HLD, [id number] -hx lipitor intolerance 2/2 nausea and dizziness  -hx wt gain and water retention attributes to simvastatin (taking since 8/19), recently decreasing frequency of use with improved wt and retention reported. Asx otherwise. Advised to d/c simvastatin at 6/9/20 visit -on Crestor 5 mg since 6/20, tolerating w/o SEs -advised low fat diet, exercise encouraged  hx TMJ- right jaw muscle spasms, getting better -following with dental specialist in TM[place]- had f/u 6/20 -doing PT as able  -using dental guard -using ice prn -hx muscle relaxer use, to cont prn  -Tylenol prn  migraines- controlled, rare onset -on imitrex prn  osteoporosis-  -hx DEXA 2/2023 osteoporosis spine - doing resistance rx  -followed by GYN, seen 12/18 advised f/u q 2 yrs. Referral for new given per request. -hx actonel x 5 yrs, off 8/18 for holiday- Dexa scan 2/2/23 - osteoporosis -pt deferred rx   -ca/D -exercising encouraged  hx stress- mom with dementia in NH, stable per pt. Mild stress 2/2 covid pandemic. Denies depression or anxiety. -declines  referral -info for behavioral health crisis walk in center given prior -reports good social support -advised to f/u as needed  hx chronic LBP- asx  HCM GYN due - advised still pending  Prevnar 20 - 3/13/23  colonoscope 2016 due 10 yrs 2026 has appt with gastro in 5/2024  Flu vaccine-9/14/23  -Shingrex 7/1/22 - 10/22  hx negative hep C screening 5/2014 mammo 2/2023 bi rad 2 - referral placed  Dexa scan - osteopenia 9/2022  -hx negative PAP 12/18 by GYN per pt, requests referral for new- given saw derm - 11/2023 nl as per pt  -yearly eye and dental screening-dental next week 3/2024 - ophtho 4/2024  -reports HCP: sister, Clotilde Bocanegra, cell: [phone]. sister moving away - she now names her daughter Dante Chau as HCP  -completed Moderna vaccine 3/17/21, 4/14/21, booster 11/29/2021 ,  Tdap 5/28/21  Pt's cell: [phone].   RTC CPE  .

## 2024-03-15 LAB
ALBUMIN SERPL ELPH-MCNC: 4.9 G/DL
ALP BLD-CCNC: 69 U/L
ALT SERPL-CCNC: 57 U/L
ANION GAP SERPL CALC-SCNC: 16 MMOL/L
AST SERPL-CCNC: 35 U/L
BILIRUB SERPL-MCNC: 0.9 MG/DL
BUN SERPL-MCNC: 11 MG/DL
CALCIUM SERPL-MCNC: 10.2 MG/DL
CHLORIDE SERPL-SCNC: 99 MMOL/L
CHOLEST SERPL-MCNC: 233 MG/DL
CO2 SERPL-SCNC: 24 MMOL/L
CREAT SERPL-MCNC: 0.94 MG/DL
EGFR: 67 ML/MIN/1.73M2
GLUCOSE SERPL-MCNC: 93 MG/DL
HDLC SERPL-MCNC: 92 MG/DL
LDLC SERPL CALC-MCNC: 104 MG/DL
NONHDLC SERPL-MCNC: 141 MG/DL
POTASSIUM SERPL-SCNC: 3.9 MMOL/L
PROT SERPL-MCNC: 7.6 G/DL
SODIUM SERPL-SCNC: 138 MMOL/L
TRIGL SERPL-MCNC: 226 MG/DL

## 2024-05-22 ENCOUNTER — APPOINTMENT (OUTPATIENT)
Dept: GASTROENTEROLOGY | Facility: CLINIC | Age: 69
End: 2024-05-22
Payer: MEDICARE

## 2024-05-22 VITALS
HEART RATE: 87 BPM | WEIGHT: 145 LBS | SYSTOLIC BLOOD PRESSURE: 126 MMHG | OXYGEN SATURATION: 99 % | HEIGHT: 63 IN | BODY MASS INDEX: 25.69 KG/M2 | DIASTOLIC BLOOD PRESSURE: 75 MMHG

## 2024-05-22 DIAGNOSIS — K58.0 IRRITABLE BOWEL SYNDROME WITH DIARRHEA: ICD-10-CM

## 2024-05-22 PROCEDURE — 99214 OFFICE O/P EST MOD 30 MIN: CPT

## 2024-05-22 NOTE — ASSESSMENT
[FreeTextEntry1] : Impression: 1. IBS with diarrhea - currently well controlled with low dose amitriptyline with minimal to no side effects 2. Colon cancer screening - average risk, due for repeat screening in 2026  Plan: -Continue amitritpyline 10mg qhs; refilled today -Continue high fiber diet -Discussed potential side effects of TCA, to monitor for them and asked her to call me if she develops significant constipation/dry mouth/drowsiness -Advised repeat colonoscopy in 2026, as last was in 2016 without any polyps -Advised lifestyle modifications for reflux. Asked patient to avoid eating 3 hours before going to bed or laying down, and to elevate the head of his bed. PRN Tums as it's very rare and effective alone in controlling minor dyspepsia -Discussed that the ALT is borderline elevated; advised weight loss via dieting and exercise; discussed that if liver enzymes were to remain persistently high or increase on repeat, to return to consider full liver workup/imaging  If otherwise feeling well, RTC in 1 year

## 2024-05-22 NOTE — HISTORY OF PRESENT ILLNESS
[de-identified] : Performed 11/9/16 Findings:   The examined esophagus was normal.   The Z-line was regular and was found 38 cm from the incisors.   Localized mild inflammation characterized by congestion (edema) and   erythema was found in the gastric antrum. Biopsies were taken of the   gastric antrum and body with a cold forceps for Helicobacter pylori   testing.   Diffuse moderate inflammation characterized by erythema and aphthous   ulcerations was found in the duodenal bulb and in the second part of the   duodenum. Biopsies for histology were taken of the 2nd portion of the   duodenum and bulb with a cold forceps for evaluation of celiac disease.    Impression: - Gastritis. Biopsied.   - Duodenitis. Biopsied.    Path:  5. Duodenum, biopsy  - Duodenal mucosa with features of peptic duodenitis.  - Negative for gluten-sensitive enteropathy.  - Negative for microorganisms.    6. Stomach, biopsy  - Gastric antral mucosa with intestinal metaplasia and  chronic inactive gastritis.  - Gastric oxyntic mucosa with chronic inactive gastritis.  - Negative for Helicobacter microorganisms (Warthin-Starry  stain).  - Negative for dysplasia.   [FreeTextEntry1] : Performed 11/9/16 . The quality of the   bowel preparation was adequate.    Findings:   The perianal exam findings include non-thrombosed external hemorrhoids.   The digital rectal exam was normal. Pertinent negatives include normal   sphincter tone and no palpable rectal lesions.   Non-bleeding internal hemorrhoids were found during retroflexion. The   hemorrhoids were small.   A moderate amount of liquid stool was found in the entire colon,   precluding visualization. Lavage of the area was performed using copious   amounts of sterile water, resulting in clearance with adequate   visualization.   There is no endoscopic evidence of erythema, inflammation, mass or   polyps in the entire colon. Random biopsies were taken of the ascending   colon, transverse colon, descending colon, and sigmoid colon to evaluate   for microscopic colitis.   No other significant abnormalities were identified in a careful   examination of the remainder of the colon.    Impression: - Non-thrombosed external hemorrhoids found on perianal   exam.   - Non-bleeding internal hemorrhoids.   - Normal colonoscopy. Random biopsies were taken of the   ascending colon, transverse colon, descending colon, and   sigmoid colon to evaluate for microscopic colitis.    Path:  1. Colon, ascending, biopsy  - Colonic mucosa with no significant diagnostic alterations.    2. Colon, transverse, biopsy  - Colonic mucosa with no significant diagnostic alterations.    3. Colon, descending, biopsy  - Colonic mucosa with no significant diagnostic alterations.    4. Colon, sigmoid, biopsy  - Colonic mucosa with no significant diagnostic alterations.

## 2024-07-15 ENCOUNTER — RX RENEWAL (OUTPATIENT)
Age: 69
End: 2024-07-15

## 2024-09-16 ENCOUNTER — APPOINTMENT (OUTPATIENT)
Dept: INTERNAL MEDICINE | Facility: CLINIC | Age: 69
End: 2024-09-16
Payer: MEDICARE

## 2024-09-16 VITALS
HEIGHT: 63 IN | DIASTOLIC BLOOD PRESSURE: 84 MMHG | WEIGHT: 143 LBS | HEART RATE: 76 BPM | OXYGEN SATURATION: 98 % | RESPIRATION RATE: 16 BRPM | SYSTOLIC BLOOD PRESSURE: 121 MMHG | BODY MASS INDEX: 25.34 KG/M2 | TEMPERATURE: 97.2 F

## 2024-09-16 DIAGNOSIS — Z00.00 ENCOUNTER FOR GENERAL ADULT MEDICAL EXAMINATION W/OUT ABNORMAL FINDINGS: ICD-10-CM

## 2024-09-16 DIAGNOSIS — Z23 ENCOUNTER FOR IMMUNIZATION: ICD-10-CM

## 2024-09-16 PROCEDURE — G0439: CPT

## 2024-09-16 PROCEDURE — G0008: CPT

## 2024-09-16 PROCEDURE — 36415 COLL VENOUS BLD VENIPUNCTURE: CPT

## 2024-09-16 PROCEDURE — 90662 IIV NO PRSV INCREASED AG IM: CPT

## 2024-09-16 NOTE — PHYSICAL EXAM
[No Acute Distress] : no acute distress [Normal Sclera/Conjunctiva] : normal sclera/conjunctiva [PERRL] : pupils equal round and reactive to light [EOMI] : extraocular movements intact [Normal Outer Ear/Nose] : the outer ears and nose were normal in appearance [Normal Oropharynx] : the oropharynx was normal [No JVD] : no jugular venous distention [No Lymphadenopathy] : no lymphadenopathy [Supple] : supple [Thyroid Normal, No Nodules] : the thyroid was normal and there were no nodules present [No Respiratory Distress] : no respiratory distress  [No Accessory Muscle Use] : no accessory muscle use [Clear to Auscultation] : lungs were clear to auscultation bilaterally [Normal Rate] : normal rate  [Regular Rhythm] : with a regular rhythm [Normal S1, S2] : normal S1 and S2 [No Murmur] : no murmur heard [No Abdominal Bruit] : a ~M bruit was not heard ~T in the abdomen [No Varicosities] : no varicosities [Pedal Pulses Present] : the pedal pulses are present [No Edema] : there was no peripheral edema [No Extremity Clubbing/Cyanosis] : no extremity clubbing/cyanosis [Soft] : abdomen soft [Non Tender] : non-tender [Non-distended] : non-distended [No Masses] : no abdominal mass palpated [No HSM] : no HSM [Normal Bowel Sounds] : normal bowel sounds [Normal Posterior Cervical Nodes] : no posterior cervical lymphadenopathy [Normal Anterior Cervical Nodes] : no anterior cervical lymphadenopathy [No CVA Tenderness] : no CVA  tenderness [No Spinal Tenderness] : no spinal tenderness [No Joint Swelling] : no joint swelling [Grossly Normal Strength/Tone] : grossly normal strength/tone [No Rash] : no rash [Coordination Grossly Intact] : coordination grossly intact [No Focal Deficits] : no focal deficits [Normal Gait] : normal gait [Deep Tendon Reflexes (DTR)] : deep tendon reflexes were 2+ and symmetric [Normal Affect] : the affect was normal [Normal Insight/Judgement] : insight and judgment were intact

## 2024-09-16 NOTE — HISTORY OF PRESENT ILLNESS
[de-identified] : This is a 68 yo F pmhx HLD, osteoporosis, migraine HAs, stress seen for AWV  weight training and stretiching now - limited due to plantar fascitis acting up 15 min walking  doing PT   hand OA- using Volertan gel on and off   hx left ankle fracture after stepping on pot [place] - 8/18/22 - leg in Boot - no weight bearing - followed by Podiatrist - wering compression socks for swelling  -saw Ortho - started PT - helping - still hurts to walk in grocery stores   had dental sx - will be getting implant - took amoxicillin - advised to start imodium when starting antibiotics  -helped   Denies fever/chills, cough, CP, sob, palpitations, dizziness, PND, orthopnea or leg swelling  hx diarrhea/constipation- on/off, chronic- stable sx's- no episode for 21 days  - advised to increase Citrucel bid daily -has BM daily normal  -no abd pain or cramping - on amitriptyline 10 at bedtime helps  - saw Gastro DR Hung 8/18 /2021 placed her on Xifaxan 550 3 x a day for 2 week - feels a lot better - no pain or upset stomach / diarrhea  -on Imodium prn -hx c-scope/EGD 11/16, told no intervention needed by GI- due 10 yrs - saw him 8/2021  -reports nl appetite; denies BRBPR, melena, n/v or abd pain.  -gets diarrhea - since morning she went 4 times - could be ok 10 days then 2 days cannot leave home due to diarrhea - has less control on it , starts with feeling of need to go has to pull over and go - if not she will soil , not associated with abdoment cramp / pain- saw gastro since she was in her 20's  -does colonoscopes -due in later this ear also did EGd all nl 2016  -she has colitis / gastritis/ gastroenteritis / does not wake her up from sleep - only if its her bad day she will wake up at 6am to go have BM   Hx anxiety - since her husbands death - pt has been having lot of stomach issues - she told her it could be autoimmune disease - recommended to check few things  hx TMJ- right jaw muscle spasms, getting better -following with dental specialist in [place]- had f/u 6/20 -doing PT on/off, is helping. Also does on own at home -using dental guard -using ice with help -hx muscle relaxer use prn -using Tylenol sparingly

## 2024-09-16 NOTE — ASSESSMENT
[FreeTextEntry1] : b/l plantar fasciitis -  - doing PT   hx left ankle fracture after stepping on pot [place] - 8/18/22 -better  -saw Ortho   hand OA- using Volertan gel on and off   hx diarrhea/constipation- on/off, chronic- better after rx with Xifaxan  - saw GASTRO 5/22/24 - taking Amitriptaline 10 mg qhs  -stable sx's- no episode for 21 days  - advised to increase Citrucel bid daily -has BM daily normal  -no abd pain or cramping - on amitriptyline 10 at bedtime helps  -- saw Gastro DR Hung 8/18 /2021 placed her on Xifaxan 550 3 x a day for 2 week - feels a lot better - no pain or upset stomach / diarrhea  -followed by GI -on Imodium prn -hx c-scope/EGD 11/16-due 10 yrs  -advised to f/u if GI sx's arise  HLD, [id number] -hx lipitor intolerance 2/2 nausea and dizziness  -hx wt gain and water retention attributes to simvastatin (taking since 8/19), recently decreasing frequency of use with improved wt and retention reported. Asx otherwise. Advised to d/c simvastatin at 6/9/20 visit -on Crestor 5 mg since 6/20, tolerating w/o SEs -advised low fat diet, exercise encouraged  hx TMJ- right jaw muscle spasms, getting better -following with dental specialist in [place]- had f/u 6/20 -doing PT as able  -using dental guard -using ice prn -hx muscle relaxer use, to cont prn  -Tylenol prn  migraines- controlled, rare onset -on imitrex prn  osteoporosis-  -hx DEXA 2/2023 osteoporosis spine - doing resistance rx  -followed by GYN, seen 12/18 advised f/u q 2 yrs. Referral for new given per request. -hx actonel x 5 yrs, off 8/18 for holiday- Dexa scan 2/2/23 - osteoporosis -pt deferred rx   -ca/D -exercising encouraged  hx stress- mom with dementia in NH, stable per pt. Mild stress 2/2 covid pandemic. Denies depression or anxiety. -declines  referral -info for behavioral health crisis walk in center given prior -reports good social support -advised to f/u as needed  hx chronic LBP- asx  HCM GYN due - advised still pending  Prevnar 20 - 3/13/23  colonoscope 2016 due 10 yrs 2026 saw gastro i/2024  Flu vaccine-9/16/24  -Shingrex 7/1/22 - 10/22  hx negative hep C screening 5/2014 mammo 2/2023 bi rad 2 - referral placed has appt 9/24  Dexa scan - osteopenia 9/2022 has appt 9/2024  -hx negative PAP 12/18 by GYN per pt, requests referral for new- given saw derm - 11/2023 nl as per pt  -yearly eye and dental screening-dental next week 3/2024 - ophtho 4/2024  -reports HCP: sister, Clotilde Bocanegra, cell: [phone]. sister moving away - she now names her daughter Dante Chau as HCP  -completed Moderna vaccine 3/17/21, 4/14/21, booster 11/29/2021 ,  Tdap 5/28/21  Pt's cell: [phone].   .

## 2024-09-16 NOTE — HEALTH RISK ASSESSMENT
[Good] : ~his/her~  mood as  good [Yes] : Yes [4 or more  times a week (4 pts)] : 4 or more  times a week (4 points) [1 or 2 (0 pts)] : 1 or 2 (0 points) [Never (0 pts)] : Never (0 points) [No] : In the past 12 months have you used drugs other than those required for medical reasons? No [No falls in past year] : Patient reported no falls in the past year [0] : 2) Feeling down, depressed, or hopeless: Not at all (0) [PHQ-2 Negative - No further assessment needed] : PHQ-2 Negative - No further assessment needed [Never] : Never [Alone] : lives alone [Retired] : retired [] :  [Fully functional (bathing, dressing, toileting, transferring, walking, feeding)] : Fully functional (bathing, dressing, toileting, transferring, walking, feeding) [Fully functional (using the telephone, shopping, preparing meals, housekeeping, doing laundry, using] : Fully functional and needs no help or supervision to perform IADLs (using the telephone, shopping, preparing meals, housekeeping, doing laundry, using transportation, managing medications and managing finances) [de-identified] : weight training and stretiching now - limited due to plantar fascitis acting up 15 min walking  [[id number]] : 0 [Reports changes in hearing] : Reports no changes in hearing [Reports changes in vision] : Reports no changes in vision [Reports changes in dental health] : Reports no changes in dental health [de-identified] : 4/24  [AdvancecareDate] : 9/16/24

## 2024-09-17 LAB
25(OH)D3 SERPL-MCNC: 45.5 NG/ML
ALBUMIN SERPL ELPH-MCNC: 5 G/DL
ALP BLD-CCNC: 74 U/L
ALT SERPL-CCNC: 48 U/L
ANION GAP SERPL CALC-SCNC: 14 MMOL/L
APPEARANCE: CLEAR
AST SERPL-CCNC: 37 U/L
BACTERIA: NEGATIVE /HPF
BILIRUB SERPL-MCNC: 0.8 MG/DL
BILIRUBIN URINE: NEGATIVE
BLOOD URINE: NEGATIVE
BUN SERPL-MCNC: 11 MG/DL
CALCIUM SERPL-MCNC: 10.5 MG/DL
CAST: 0 /LPF
CHLORIDE SERPL-SCNC: 99 MMOL/L
CHOLEST SERPL-MCNC: 214 MG/DL
CO2 SERPL-SCNC: 24 MMOL/L
COLOR: YELLOW
CREAT SERPL-MCNC: 0.95 MG/DL
EGFR: 66 ML/MIN/1.73M2
EPITHELIAL CELLS: 4 /HPF
ESTIMATED AVERAGE GLUCOSE: 103 MG/DL
GLUCOSE QUALITATIVE U: NEGATIVE MG/DL
GLUCOSE SERPL-MCNC: 97 MG/DL
HBA1C MFR BLD HPLC: 5.2 %
HCT VFR BLD CALC: 41.1 %
HDLC SERPL-MCNC: 88 MG/DL
HGB BLD-MCNC: 13.7 G/DL
KETONES URINE: NEGATIVE MG/DL
LDLC SERPL CALC-MCNC: 101 MG/DL
LEUKOCYTE ESTERASE URINE: ABNORMAL
MCHC RBC-ENTMCNC: 30.3 PG
MCHC RBC-ENTMCNC: 33.3 GM/DL
MCV RBC AUTO: 90.9 FL
MICROSCOPIC-UA: NORMAL
NITRITE URINE: NEGATIVE
NONHDLC SERPL-MCNC: 126 MG/DL
PH URINE: 7.5
PLATELET # BLD AUTO: 245 K/UL
POTASSIUM SERPL-SCNC: 4.2 MMOL/L
PROT SERPL-MCNC: 7.5 G/DL
PROTEIN URINE: NEGATIVE MG/DL
RBC # BLD: 4.52 M/UL
RBC # FLD: 12.9 %
RED BLOOD CELLS URINE: 0 /HPF
SODIUM SERPL-SCNC: 137 MMOL/L
SPECIFIC GRAVITY URINE: 1.01
TRIGL SERPL-MCNC: 149 MG/DL
TSH SERPL-ACNC: 2.16 UIU/ML
UROBILINOGEN URINE: 0.2 MG/DL
VIT B12 SERPL-MCNC: 1057 PG/ML
WBC # FLD AUTO: 5.67 K/UL
WHITE BLOOD CELLS URINE: 18 /HPF

## 2024-12-26 ENCOUNTER — RX RENEWAL (OUTPATIENT)
Age: 69
End: 2024-12-26

## 2025-03-17 ENCOUNTER — APPOINTMENT (OUTPATIENT)
Dept: INTERNAL MEDICINE | Facility: CLINIC | Age: 70
End: 2025-03-17
Payer: MEDICARE

## 2025-03-17 VITALS
WEIGHT: 142 LBS | BODY MASS INDEX: 25.16 KG/M2 | DIASTOLIC BLOOD PRESSURE: 81 MMHG | SYSTOLIC BLOOD PRESSURE: 131 MMHG | HEIGHT: 63 IN | TEMPERATURE: 97.6 F | OXYGEN SATURATION: 99 % | HEART RATE: 73 BPM

## 2025-03-17 DIAGNOSIS — S93.492A SPRAIN OF OTHER LIGAMENT OF LEFT ANKLE, INITIAL ENCOUNTER: ICD-10-CM

## 2025-03-17 DIAGNOSIS — Z86.69 PERSONAL HISTORY OF OTHER DISEASES OF THE NERVOUS SYSTEM AND SENSE ORGANS: ICD-10-CM

## 2025-03-17 DIAGNOSIS — E78.5 HYPERLIPIDEMIA, UNSPECIFIED: ICD-10-CM

## 2025-03-17 DIAGNOSIS — K58.0 IRRITABLE BOWEL SYNDROME WITH DIARRHEA: ICD-10-CM

## 2025-03-17 DIAGNOSIS — M81.0 AGE-RELATED OSTEOPOROSIS W/OUT CURRENT PATHOLOGICAL FRACTURE: ICD-10-CM

## 2025-03-17 PROCEDURE — 36415 COLL VENOUS BLD VENIPUNCTURE: CPT

## 2025-03-17 PROCEDURE — 99214 OFFICE O/P EST MOD 30 MIN: CPT

## 2025-03-17 PROCEDURE — G2211 COMPLEX E/M VISIT ADD ON: CPT

## 2025-03-18 LAB
ALBUMIN SERPL ELPH-MCNC: 5 G/DL
ALP BLD-CCNC: 82 U/L
ALT SERPL-CCNC: 35 U/L
ANION GAP SERPL CALC-SCNC: 16 MMOL/L
AST SERPL-CCNC: 27 U/L
BILIRUB SERPL-MCNC: 1 MG/DL
BUN SERPL-MCNC: 10 MG/DL
CALCIUM SERPL-MCNC: 10.1 MG/DL
CHLORIDE SERPL-SCNC: 98 MMOL/L
CHOLEST SERPL-MCNC: 245 MG/DL
CO2 SERPL-SCNC: 24 MMOL/L
CREAT SERPL-MCNC: 1.04 MG/DL
EGFRCR SERPLBLD CKD-EPI 2021: 59 ML/MIN/1.73M2
GLUCOSE SERPL-MCNC: 97 MG/DL
HDLC SERPL-MCNC: 98 MG/DL
LDLC SERPL-MCNC: 110 MG/DL
NONHDLC SERPL-MCNC: 147 MG/DL
POTASSIUM SERPL-SCNC: 4.3 MMOL/L
PROT SERPL-MCNC: 8 G/DL
SODIUM SERPL-SCNC: 137 MMOL/L
TRIGL SERPL-MCNC: 220 MG/DL

## 2025-03-31 ENCOUNTER — APPOINTMENT (OUTPATIENT)
Dept: MAMMOGRAPHY | Facility: CLINIC | Age: 70
End: 2025-03-31
Payer: MEDICARE

## 2025-03-31 ENCOUNTER — APPOINTMENT (OUTPATIENT)
Dept: ULTRASOUND IMAGING | Facility: CLINIC | Age: 70
End: 2025-03-31
Payer: MEDICARE

## 2025-03-31 ENCOUNTER — RESULT REVIEW (OUTPATIENT)
Age: 70
End: 2025-03-31

## 2025-03-31 ENCOUNTER — APPOINTMENT (OUTPATIENT)
Dept: RADIOLOGY | Facility: CLINIC | Age: 70
End: 2025-03-31
Payer: MEDICARE

## 2025-03-31 PROCEDURE — 77080 DXA BONE DENSITY AXIAL: CPT

## 2025-03-31 PROCEDURE — 77063 BREAST TOMOSYNTHESIS BI: CPT

## 2025-03-31 PROCEDURE — 77067 SCR MAMMO BI INCL CAD: CPT

## 2025-03-31 PROCEDURE — 76641 ULTRASOUND BREAST COMPLETE: CPT | Mod: 50,GA

## 2025-06-12 ENCOUNTER — RX RENEWAL (OUTPATIENT)
Age: 70
End: 2025-06-12

## 2025-06-14 ENCOUNTER — NON-APPOINTMENT (OUTPATIENT)
Age: 70
End: 2025-06-14

## 2025-06-16 ENCOUNTER — APPOINTMENT (OUTPATIENT)
Dept: GASTROENTEROLOGY | Facility: CLINIC | Age: 70
End: 2025-06-16
Payer: MEDICARE

## 2025-06-16 VITALS
BODY MASS INDEX: 25.52 KG/M2 | SYSTOLIC BLOOD PRESSURE: 138 MMHG | WEIGHT: 144 LBS | HEIGHT: 63 IN | OXYGEN SATURATION: 99 % | HEART RATE: 79 BPM | DIASTOLIC BLOOD PRESSURE: 80 MMHG

## 2025-06-16 PROCEDURE — 99213 OFFICE O/P EST LOW 20 MIN: CPT

## 2025-09-08 DIAGNOSIS — T14.8XXA OTHER INJURY OF UNSPECIFIED BODY REGION, INITIAL ENCOUNTER: ICD-10-CM

## 2025-09-19 ENCOUNTER — APPOINTMENT (OUTPATIENT)
Dept: INTERNAL MEDICINE | Facility: CLINIC | Age: 70
End: 2025-09-19
Payer: MEDICARE

## 2025-09-19 ENCOUNTER — LABORATORY RESULT (OUTPATIENT)
Age: 70
End: 2025-09-19

## 2025-09-19 VITALS
OXYGEN SATURATION: 98 % | HEIGHT: 63 IN | TEMPERATURE: 98 F | HEART RATE: 74 BPM | DIASTOLIC BLOOD PRESSURE: 76 MMHG | BODY MASS INDEX: 25.52 KG/M2 | SYSTOLIC BLOOD PRESSURE: 135 MMHG | WEIGHT: 144 LBS

## 2025-09-19 DIAGNOSIS — Z00.00 ENCOUNTER FOR GENERAL ADULT MEDICAL EXAMINATION W/OUT ABNORMAL FINDINGS: ICD-10-CM

## 2025-09-19 PROCEDURE — 36415 COLL VENOUS BLD VENIPUNCTURE: CPT

## 2025-09-19 PROCEDURE — G0439: CPT

## 2025-09-19 RX ORDER — CYCLOBENZAPRINE HYDROCHLORIDE 5 MG/1
5 TABLET, FILM COATED ORAL
Qty: 15 | Refills: 0 | Status: ACTIVE | COMMUNITY
Start: 2025-09-08 | End: 1900-01-01

## 2025-09-22 LAB
25(OH)D3 SERPL-MCNC: 43.1 NG/ML
ALBUMIN SERPL ELPH-MCNC: 5 G/DL
ALP BLD-CCNC: 84 U/L
ALT SERPL-CCNC: 51 U/L
ANION GAP SERPL CALC-SCNC: 17 MMOL/L
APPEARANCE: CLEAR
AST SERPL-CCNC: 30 U/L
BILIRUB SERPL-MCNC: 1 MG/DL
BILIRUBIN URINE: NEGATIVE
BLOOD URINE: NEGATIVE
BUN SERPL-MCNC: 10 MG/DL
CALCIUM SERPL-MCNC: 10.8 MG/DL
CHLORIDE SERPL-SCNC: 98 MMOL/L
CHOLEST SERPL-MCNC: 251 MG/DL
CO2 SERPL-SCNC: 22 MMOL/L
COLOR: YELLOW
CREAT SERPL-MCNC: 1.03 MG/DL
EGFRCR SERPLBLD CKD-EPI 2021: 59 ML/MIN/1.73M2
ESTIMATED AVERAGE GLUCOSE: 105 MG/DL
GLUCOSE QUALITATIVE U: NEGATIVE MG/DL
GLUCOSE SERPL-MCNC: 93 MG/DL
HBA1C MFR BLD HPLC: 5.3 %
HCT VFR BLD CALC: 44 %
HDLC SERPL-MCNC: 88 MG/DL
HGB BLD-MCNC: 14.7 G/DL
KETONES URINE: NEGATIVE MG/DL
LDLC SERPL-MCNC: 125 MG/DL
LEUKOCYTE ESTERASE URINE: ABNORMAL
MCHC RBC-ENTMCNC: 31.4 PG
MCHC RBC-ENTMCNC: 33.4 G/DL
MCV RBC AUTO: 94 FL
NITRITE URINE: NEGATIVE
NONHDLC SERPL-MCNC: 163 MG/DL
PH URINE: 7
PLATELET # BLD AUTO: 268 K/UL
POTASSIUM SERPL-SCNC: 4.3 MMOL/L
PROT SERPL-MCNC: 8.2 G/DL
PROTEIN URINE: NEGATIVE MG/DL
RBC # BLD: 4.68 M/UL
RBC # FLD: 13 %
SODIUM SERPL-SCNC: 137 MMOL/L
SPECIFIC GRAVITY URINE: 1.01
TRIGL SERPL-MCNC: 220 MG/DL
TSH SERPL-ACNC: 2.6 UIU/ML
UROBILINOGEN URINE: 0.2 MG/DL
VIT B12 SERPL-MCNC: 1067 PG/ML
WBC # FLD AUTO: 7.2 K/UL